# Patient Record
Sex: FEMALE | Race: WHITE | NOT HISPANIC OR LATINO | Employment: OTHER
[De-identification: names, ages, dates, MRNs, and addresses within clinical notes are randomized per-mention and may not be internally consistent; named-entity substitution may affect disease eponyms.]

---
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
## 2018-07-02 ENCOUNTER — HOSPITAL ENCOUNTER (EMERGENCY)
Facility: CLINIC | Age: 18
Discharge: HOME | End: 2018-07-02
Attending: EMERGENCY MEDICINE | Admitting: EMERGENCY MEDICINE
Payer: COMMERCIAL

## 2018-07-02 VITALS
RESPIRATION RATE: 12 BRPM | BODY MASS INDEX: 18.83 KG/M2 | TEMPERATURE: 98.3 F | HEIGHT: 67 IN | WEIGHT: 120 LBS | DIASTOLIC BLOOD PRESSURE: 73 MMHG | HEART RATE: 80 BPM | OXYGEN SATURATION: 100 % | SYSTOLIC BLOOD PRESSURE: 118 MMHG

## 2018-07-02 DIAGNOSIS — N92.0 MENORRHAGIA WITH REGULAR CYCLE: ICD-10-CM

## 2018-07-02 LAB
HCG UR QL: NEGATIVE
HGB BLD-MCNC: 12.7 G/DL (ref 11.7–15.7)

## 2018-07-02 PROCEDURE — 99284 EMERGENCY DEPT VISIT MOD MDM: CPT | Performed by: EMERGENCY MEDICINE

## 2018-07-02 PROCEDURE — 96372 THER/PROPH/DIAG INJ SC/IM: CPT | Performed by: EMERGENCY MEDICINE

## 2018-07-02 PROCEDURE — 25000128 H RX IP 250 OP 636: Performed by: EMERGENCY MEDICINE

## 2018-07-02 PROCEDURE — 99284 EMERGENCY DEPT VISIT MOD MDM: CPT | Mod: Z6 | Performed by: EMERGENCY MEDICINE

## 2018-07-02 PROCEDURE — 81025 URINE PREGNANCY TEST: CPT | Performed by: EMERGENCY MEDICINE

## 2018-07-02 PROCEDURE — 85018 HEMOGLOBIN: CPT | Performed by: EMERGENCY MEDICINE

## 2018-07-02 RX ORDER — KETOROLAC TROMETHAMINE 30 MG/ML
60 INJECTION, SOLUTION INTRAMUSCULAR; INTRAVENOUS ONCE
Status: COMPLETED | OUTPATIENT
Start: 2018-07-02 | End: 2018-07-02

## 2018-07-02 RX ADMIN — KETOROLAC TROMETHAMINE 60 MG: 30 INJECTION, SOLUTION INTRAMUSCULAR at 21:04

## 2018-07-02 NOTE — ED AVS SNAPSHOT
[facility] Emergency Department    [address] [facility] [address]    Phone:  [phone]    Fax:  [phone]                                       Pat Chaney   MRN: [id number]    Department:  [facility] Emergency Department   Date of Visit:  7/2/2018           After Visit Summary Signature Page     I have received my discharge instructions, and my questions have been answered. I have discussed any challenges I see with this plan with the nurse or doctor.    ..........................................................................................................................................  Patient/Patient Representative Signature      ..........................................................................................................................................  Patient Representative Print Name and Relationship to Patient    ..................................................               ................................................  Date                                            Time    ..........................................................................................................................................  Reviewed by Signature/Title    ...................................................              ..............................................  Date                                                            Time

## 2018-07-02 NOTE — ED AVS SNAPSHOT
[facility] Emergency Department    [address] [facility] [address]    Phone:  [phone]    Fax:  [phone]                                       Pat Chaney   MRN: [id number]    Department:  [facility] Emergency Department   Date of Visit:  7/2/2018           Patient Information     Date Of Birth          2000        Your diagnoses for this visit were:     Menorrhagia with regular cycle        You were seen by Arben Keita MD.      Follow-up Information     Schedule an appointment as soon as possible for a visit with [facility].    Specialty:  OB/Gyn    Contact information:    [facility] [address] [phone]    Additional information:    The medical center is located at   [address] ([address] in [place], [place] north   of [place]).        Discharge Instructions         Heavy Menstrual Bleeding    Heavy menstrual bleeding means that your periods are heavier or longer than usual. You may soak through a pad or tampon every 1 to 3 hours on the heaviest days of your period. You may also pass large, dark clots. And your periods may last longer than 7 days.  If you have heavy periods often, this can cause a problem called anemia. With anemia, your red blood cell count is too low. Red blood cells are needed because they help carry oxygen throughout your body. Severe anemia may cause you to look pale and feel weak or tired. You might also become short of breath easily.  There are many possible causes of heavy menstrual bleeding. Hormonal imbalance is the most common cause. Having benign growths in your uterus, such as fibroids or polyps, is another cause. Taking certain medicines or having certain health problems or bleeding disorders are also causes.  To treat heavy menstrual bleeding, your healthcare provider may prescribe medicines first. If these don t help, you may need further testing and  treatments.  Home care  Medicines  If you re prescribed medicines, be sure to take them as directed.    To help control heavy bleeding, any of the following may be used:  ? Hormone therapy (this includes all methods of hormonal birth control such as pills, shots, cream, ring, patch, or hormone-releasing IUD)  ? Nonsteroidal anti-inflammatory drugs (NSAIDs), such as ibuprofen  ? Antifibrinolytic medicines, such as transexamic acid    To help treat anemia, iron supplements may be prescribed.            General care    Get plenty of rest if you tire easily. Avoid heavy exertion.    To help relieve pain or cramping, try using a heating pad on the lower belly or back. A warm bath may also help.  Follow-up care  Follow up with your healthcare provider, or as advised.  When to seek medical advice  Call your healthcare provider right away if any of these occur:    Heavier bleeding (soaking 1 pad or tampon every hour for 3 hours)    Heavy bleeding that lasts longer than 1 week    Fever of 100.4 F (38 C) or higher, or as directed by your provider    Pain or cramping that gets worse instead of better    Signs of anemia such as pale skin, extreme fatigue or weakness, or shortness of breath    Dizziness or fainting  Date Last Reviewed: 10/1/2017    2580-0725 [employer]. [address] [place] [address], [place] [address]. All rights reserved. This information is not intended as a substitute for professional medical care. Always follow your healthcare professional's instructions.          Dysfunctional Uterine Bleeding    Dysfunctional uterine bleeding, also called abnormal uterine bleeding, is a condition in which bleeding is abnormal and occurs at unexpected times of the month. This happens because of changes in the hormones that help control a woman s menstrual cycle each month.  The bleeding may be heavier or lighter than normal. If you have heavy bleeding often, this can lead to a problem called anemia. With anemia,  your red blood cell count is too low. Red blood cells help carry oxygen throughout your body. Severe anemia may cause you to look pale and feel very weak or tired. You might also become short of breath easily.  To treat dysfunctional uterine bleeding, medicines are often tried first. If these don t help, or if you have additional symptoms or have reached menopause, further testing and treatments may be needed. Discuss all of your options with your provider.  Home care  Medicines  If you re prescribed medicines, be sure to take them as directed. Some of the more common medicines you may be prescribed include:    Hormone therapy (Options include most methods of hormonal birth control such as pills, shots, or a hormone-releasing IUD)    Nonsteroidal anti-inflammatory drugs (NSAIDs), such as ibuprofen    Iron supplements, if you have anemia     General care    Get plenty of rest if you tire easily. Avoid heavy exertion.    To help relieve pain or cramping that may occur with bleeding, try using a heating pad on the lower belly or back. A warm bath may also help.  Follow-up care  Follow up with your healthcare provider, or as directed.  When to seek medical advice  Call your healthcare provider right away if:    Bleeding becomes heavy (soaking 1 pad or tampon every hour for 3 hours)    Increased abdominal pain    Irregular bleeding worsens or does not get better even with treatment    Fever of 100.4 F (38 C) or higher, or as directed by your provider    Signs of anemia, such as pale skin, extreme fatigue or weakness, or shortness of breath    Dizziness or fainting   Date Last Reviewed: 11/1/2017 2000-2017 [employer]. [address] [place] [address], [place] [address]. All rights reserved. This information is not intended as a substitute for professional medical care. Always follow your healthcare professional's instructions.          Understanding the Normal Menstrual Cycle  Having a period (menstruation) is a  normal, healthy part of being a woman. It s also part of the menstrual cycle, a process that makes it possible for women to become pregnant. The first day of your period is the first day of your menstrual cycle.   A woman who has irregular cycles can become pregnant during bleeding. This may not be a true menstrual period.   An egg is released    Eggs are female reproductive cells stored in the ovaries. During each cycle, a woman's hormones trigger an egg, (usually 1), to mature and be released from an ovary. This is called ovulation. The egg then travels from the ovary to a fallopian tube.  The egg travels through a tube  The egg moves through the fallopian tube toward the uterus. If sperm are present in the tube, the egg may be fertilized, and pregnancy could result.  The uterine lining grows thicker  The lining of the uterus is made up of blood, tissue, and fluid. During each cycle, hormones cause the lining to thicken. This helps prepare the uterus to receive and nourish a fertilized egg.   The egg and lining are shed  If pregnancy doesn t happen, the egg and thickened lining of the uterus are no longer needed. They are then shed through the vagina. This is called a menstrual period.  How long is each cycle?  It is normal for a cycle to take 21 to 34 days. For teenagers, the time between periods might be as much as 45 days. For adults, it will be around a month from the first day of one period to the first day of the next. That s why you may hear women talk about a  monthly cycle,  even though cycle length can vary from one month to another, and anywhere from 3 to 5 weeks is normal. Not everyone has a 28-day cycle.    How long does a period last?  It s normal for a period to last 2 to 7 days. Talk to your healthcare provider if your period lasts longer than 7 days for 2 cycles in a row.  Date Last Reviewed: 12/1/2016 2000-2017 [employer] [address] [facility], [place] [address]. All rights  reserved. This information is not intended as a substitute for professional medical care. Always follow your healthcare professional's instructions.          24 Hour Appointment Hotline       To make an appointment at any [facility], call [phone] ([phone]). If you don't have a family doctor or clinic, we will help you find one. [facility] are conveniently located to serve the needs of you and your family.             Review of your medicines      Our records show that you are taking the medicines listed below. If these are incorrect, please call your family doctor or clinic.        Dose / Directions Last dose taken    CONCERTA 36 MG CR tablet   Generic drug:  methylphenidate ER        once daily   Refills:  0        RECLIPSEN 0.15-30 MG-MCG per tablet   Generic drug:  desogestrel-ethinyl estradiol        Refills:  0                Procedures and tests performed during your visit     HCG qualitative urine    Hemaglobin      Orders Needing Specimen Collection     None      Pending Results     No orders found from 6/30/2018 to 7/3/2018.            Pending Culture Results     No orders found from 6/30/2018 to 7/3/2018.            Pending Results Instructions     If you had any lab results that were not finalized at the time of your Discharge, you can call the ED Lab Result RN at [phone]. You will be contacted by this team for any positive Lab results or changes in treatment. The nurses are available 7 days a week from 10A to 6:30P.  You can leave a message 24 hours per day and they will return your call.        Test Results From Your Hospital Stay        7/2/2018  9:26 PM      Component Results     Component Value Ref Range & Units Status    HCG Qual Urine Negative NEG^Negative Final    This test is for screening purposes.  Results should be interpreted along with   the clinical picture.  Confirmation testing is available if warranted by   ordering EUT472, HCG Quantitative Pregnancy.       "     2018  9:08 PM      Component Results     Component Value Ref Range & Units Status    Hemoglobin 12.7 11.7 - 15.7 g/dL Final                Thank you for choosing [facility]       Thank you for choosing [facility] for your care. Our goal is always to provide you with excellent care. Hearing back from our patients is one way we can continue to improve our services. Please take a few minutes to complete the written survey that you may receive in the mail after you visit with us. Thank you!        [employer] Information     [employer] lets you send messages to your doctor, view your test results, renew your prescriptions, schedule appointments and more. To sign up, go to [email or web address] . Click on \"Log in\" on the left side of the screen, which will take you to the Welcome page. Then click on \"Sign up Now\" on the right side of the page.     You will be asked to enter the access code listed below, as well as some personal information. Please follow the directions to create your username and password.     Your access code is: [email or web address]  Expires: 2018 10:52 PM     Your access code will  in 90 days. If you need help or a new code, please call your [facility] clinic or [phone].        Care EveryWhere ID     This is your Care EveryWhere ID. This could be used by other organizations to access your [facility] medical records  [id number]        Equal Access to Services     JAE ADAMS : Hadii rylie Marino, waaxda jared, qaybta kaalmak valdez, edgardo whitman . So [facility] [phone].    ATENCIÓN: Si habla español, tiene a friend disposición servicios gratuitos de asistencia lingüística. Llame al [phone].    We comply with applicable federal civil rights laws and [place] laws. We do not discriminate on the basis of race, color, national origin, age, disability, sex, sexual orientation, or gender identity.            After Visit Summary       This is your record. " Keep this with you and show to your community pharmacist(s) and doctor(s) at your next visit.

## 2018-07-03 NOTE — DISCHARGE INSTRUCTIONS
Heavy Menstrual Bleeding    Heavy menstrual bleeding means that your periods are heavier or longer than usual. You may soak through a pad or tampon every 1 to 3 hours on the heaviest days of your period. You may also pass large, dark clots. And your periods may last longer than 7 days.  If you have heavy periods often, this can cause a problem called anemia. With anemia, your red blood cell count is too low. Red blood cells are needed because they help carry oxygen throughout your body. Severe anemia may cause you to look pale and feel weak or tired. You might also become short of breath easily.  There are many possible causes of heavy menstrual bleeding. Hormonal imbalance is the most common cause. Having benign growths in your uterus, such as fibroids or polyps, is another cause. Taking certain medicines or having certain health problems or bleeding disorders are also causes.  To treat heavy menstrual bleeding, your healthcare provider may prescribe medicines first. If these don t help, you may need further testing and treatments.  Home care  Medicines  If you re prescribed medicines, be sure to take them as directed.    To help control heavy bleeding, any of the following may be used:  ? Hormone therapy (this includes all methods of hormonal birth control such as pills, shots, cream, ring, patch, or hormone-releasing IUD)  ? Nonsteroidal anti-inflammatory drugs (NSAIDs), such as ibuprofen  ? Antifibrinolytic medicines, such as transexamic acid    To help treat anemia, iron supplements may be prescribed.            General care    Get plenty of rest if you tire easily. Avoid heavy exertion.    To help relieve pain or cramping, try using a heating pad on the lower belly or back. A warm bath may also help.  Follow-up care  Follow up with your healthcare provider, or as advised.  When to seek medical advice  Call your healthcare provider right away if any of these occur:    Heavier bleeding (soaking 1 pad or tampon  every hour for 3 hours)    Heavy bleeding that lasts longer than 1 week    Fever of 100.4 F (38 C) or higher, or as directed by your provider    Pain or cramping that gets worse instead of better    Signs of anemia such as pale skin, extreme fatigue or weakness, or shortness of breath    Dizziness or fainting  Date Last Reviewed: 10/1/2017    9557-9753 [employer]. [address] [place] [address]. All rights reserved. This information is not intended as a substitute for professional medical care. Always follow your healthcare professional's instructions.          Dysfunctional Uterine Bleeding    Dysfunctional uterine bleeding, also called abnormal uterine bleeding, is a condition in which bleeding is abnormal and occurs at unexpected times of the month. This happens because of changes in the hormones that help control a woman s menstrual cycle each month.  The bleeding may be heavier or lighter than normal. If you have heavy bleeding often, this can lead to a problem called anemia. With anemia, your red blood cell count is too low. Red blood cells help carry oxygen throughout your body. Severe anemia may cause you to look pale and feel very weak or tired. You might also become short of breath easily.  To treat dysfunctional uterine bleeding, medicines are often tried first. If these don t help, or if you have additional symptoms or have reached menopause, further testing and treatments may be needed. Discuss all of your options with your provider.  Home care  Medicines  If you re prescribed medicines, be sure to take them as directed. Some of the more common medicines you may be prescribed include:    Hormone therapy (Options include most methods of hormonal birth control such as pills, shots, or a hormone-releasing IUD)    Nonsteroidal anti-inflammatory drugs (NSAIDs), such as ibuprofen    Iron supplements, if you have anemia     General care    Get plenty of rest if you tire easily. Avoid  heavy exertion.    To help relieve pain or cramping that may occur with bleeding, try using a heating pad on the lower belly or back. A warm bath may also help.  Follow-up care  Follow up with your healthcare provider, or as directed.  When to seek medical advice  Call your healthcare provider right away if:    Bleeding becomes heavy (soaking 1 pad or tampon every hour for 3 hours)    Increased abdominal pain    Irregular bleeding worsens or does not get better even with treatment    Fever of 100.4 F (38 C) or higher, or as directed by your provider    Signs of anemia, such as pale skin, extreme fatigue or weakness, or shortness of breath    Dizziness or fainting   Date Last Reviewed: 11/1/2017 2000-2017 [employer]. [address] [place] [address], [place] [address]. All rights reserved. This information is not intended as a substitute for professional medical care. Always follow your healthcare professional's instructions.          Understanding the Normal Menstrual Cycle  Having a period (menstruation) is a normal, healthy part of being a woman. It s also part of the menstrual cycle, a process that makes it possible for women to become pregnant. The first day of your period is the first day of your menstrual cycle.   A woman who has irregular cycles can become pregnant during bleeding. This may not be a true menstrual period.   An egg is released    Eggs are female reproductive cells stored in the ovaries. During each cycle, a woman's hormones trigger an egg, (usually 1), to mature and be released from an ovary. This is called ovulation. The egg then travels from the ovary to a fallopian tube.  The egg travels through a tube  The egg moves through the fallopian tube toward the uterus. If sperm are present in the tube, the egg may be fertilized, and pregnancy could result.  The uterine lining grows thicker  The lining of the uterus is made up of blood, tissue, and fluid. During each cycle, hormones cause  the lining to thicken. This helps prepare the uterus to receive and nourish a fertilized egg.   The egg and lining are shed  If pregnancy doesn t happen, the egg and thickened lining of the uterus are no longer needed. They are then shed through the vagina. This is called a menstrual period.  How long is each cycle?  It is normal for a cycle to take 21 to 34 days. For teenagers, the time between periods might be as much as 45 days. For adults, it will be around a month from the first day of one period to the first day of the next. That s why you may hear women talk about a  monthly cycle,  even though cycle length can vary from one month to another, and anywhere from 3 to 5 weeks is normal. Not everyone has a 28-day cycle.    How long does a period last?  It s normal for a period to last 2 to 7 days. Talk to your healthcare provider if your period lasts longer than 7 days for 2 cycles in a row.  Date Last Reviewed: 12/1/2016 2000-2017 [employer]. [address] [place] [address], [place] [address]. All rights reserved. This information is not intended as a substitute for professional medical care. Always follow your healthcare professional's instructions.

## 2018-07-03 NOTE — ED NOTES
Pt presents to ED with concerns of heavy menstrual bleeding, dizziness and a headache since waking up this am. Standing makes the dizziness worse. Pt reports soaking through 1 normal pad per hour. Headache is constant. Pt reports some nausea, no vomiting, no diarrhea, no chills. Pt tolerating fluids well today; decreased appetite. Pt states there is a chance she could be pregnant, pt is on birth control.

## 2018-07-03 NOTE — ED PROVIDER NOTES
"  History     Chief Complaint   Patient presents with     Vaginal Bleeding     heavy flow, has been trying different BCP's to control with no help, she also has h/a and nausea      HPI  Pat Chaney is a 18 year old female with vaginal/uterine bleeding beginning this am. 2 days late for her usual menstrual cycle.   Not saturating pads, but changing them every one hr. No passage of clots. Feeling dizzy today, no syncope. Hx of similar DUB or menorrhagia. On OCP (Sprintec 28 day). .    Problem List:    There are no active problems to display for this patient.     Past Medical History:    History reviewed. No pertinent past medical history.    Past Surgical History:    History reviewed. No pertinent surgical history.    Family History:    Family History   Problem Relation Age of Onset     Asthma Maternal Grandmother      Arthritis Maternal Grandmother      Social History:  Marital Status:  Single [1]  Social History   Substance Use Topics     Smoking status: Passive Smoke Exposure - Never Smoker     Smokeless tobacco: Never Used      Comment: outdoors     Alcohol use No        Medications:      CONCERTA 36 MG OR TBCR   RECLIPSEN 0.15-30 MG-MCG per tablet       Review of Systems   Constitutional: Negative.    Gastrointestinal: Positive for abdominal pain (mild pelvic cramping) and nausea. Negative for blood in stool and vomiting.   Genitourinary: Negative.    Skin: Negative.    Neurological: Positive for dizziness and headaches.         Physical Exam   BP: (!) 137/91  Pulse: 80  Heart Rate: 99  Temp: 98.3  F (36.8  C)  Resp: 17  Height: 170.2 cm (5' 7\")  Weight: 54.4 kg (120 lb)  SpO2: 100 %      Physical Exam   Constitutional: She is oriented to person, place, and time. She appears well-developed and well-nourished. No distress.   HENT:   Head: Normocephalic and atraumatic.   Eyes: Conjunctivae and EOM are normal. No scleral icterus.   Neck: Normal range of motion. Neck supple.   Cardiovascular: Normal rate, " regular rhythm and normal heart sounds.    Pulmonary/Chest: Effort normal and breath sounds normal. No respiratory distress.   Abdominal: Soft. She exhibits no distension. There is no tenderness.   Musculoskeletal: Normal range of motion. She exhibits no edema.   Neurological: She is alert and oriented to person, place, and time.   Skin: Skin is warm and dry. No rash noted. No erythema. No pallor.   Psychiatric: She has a normal mood and affect. Her behavior is normal.   Nursing note and vitals reviewed.      ED Course     ED Course     Procedures               No results found for this or any previous visit (from the past 24 hour(s)).    Medications   ketorolac (TORADOL) injection 60 mg (60 mg Intramuscular Given 7/2/18 8664)     Reviewed case and consulted with Dr. Fay, Ob/Gyn.    Assessments & Plan (with Medical Decision Making)   Heavy menstrual flow, but hemodyn stable with NL Hgb and neg UPT. Will have start next pack of Sprintec OCP and f/u with Ob/Gyn. She was provided instructions for supportive care and will return as needed for worsened condition or worsening symptoms, or new problems or concerns.     I have reviewed the nursing notes.    I have reviewed the findings, diagnosis, plan and need for follow up with the patient.    Discharge Medication List as of 7/2/2018 10:52 PM          Final diagnoses:   Menorrhagia with regular cycle       7/2/2018   [facility] EMERGENCY DEPARTMENT          Arben Keita MD  07/08/18 9475

## 2018-07-08 ASSESSMENT — ENCOUNTER SYMPTOMS
VOMITING: 0
CONSTITUTIONAL NEGATIVE: 1
DIZZINESS: 1
ABDOMINAL PAIN: 1
BLOOD IN STOOL: 0
NAUSEA: 1
HEADACHES: 1

## 2018-12-27 ENCOUNTER — HOSPITAL ENCOUNTER (EMERGENCY)
Facility: CLINIC | Age: 18
Discharge: HOME | End: 2018-12-27
Attending: NURSE PRACTITIONER | Admitting: NURSE PRACTITIONER
Payer: COMMERCIAL

## 2018-12-27 VITALS
RESPIRATION RATE: 18 BRPM | TEMPERATURE: 98.4 F | OXYGEN SATURATION: 100 % | DIASTOLIC BLOOD PRESSURE: 79 MMHG | SYSTOLIC BLOOD PRESSURE: 122 MMHG | HEART RATE: 92 BPM

## 2018-12-27 DIAGNOSIS — B34.9 VIRAL SYNDROME: ICD-10-CM

## 2018-12-27 PROCEDURE — 99213 OFFICE O/P EST LOW 20 MIN: CPT | Mod: Z6 | Performed by: NURSE PRACTITIONER

## 2018-12-27 PROCEDURE — G0463 HOSPITAL OUTPT CLINIC VISIT: HCPCS | Performed by: NURSE PRACTITIONER

## 2018-12-27 RX ORDER — ARIPIPRAZOLE 5 MG/1
10 TABLET ORAL DAILY
COMMUNITY
Start: 2018-06-21

## 2018-12-27 NOTE — ED PROVIDER NOTES
History     Chief Complaint   Patient presents with     Otalgia     right ear pain  has bloody drainage     HPI      SUBJECTIVE: Pat Chaney  is here today because of:Ear Pain  The patient has had symptoms of earache, nausea and headache.   Onset of symptoms was 1 day ago. Course of illness is worsening.  Patient denies exposure to illness at home or work/school.   Patient denies fever, cough, nasal congestion/runny nose, vomiting, diarrhea, chest congestion, wheezing, myalgias and abdominal pain, dysuria, difficulty stooling, rashes, shortness of breath, chest pain  Treatment measures tried include nothing.  Patient is not a smoker  Pt reports hx of frequent ear infections as child but denies PE tubes and denies hx of ruptured ear drum    Problem List:    There are no active problems to display for this patient.       Past Medical History:    History reviewed. No pertinent past medical history.    Past Surgical History:    History reviewed. No pertinent surgical history.    Family History:    Family History   Problem Relation Age of Onset     Asthma Maternal Grandmother      Arthritis Maternal Grandmother        Social History:  Marital Status:  Single [1]  Social History     Tobacco Use     Smoking status: Passive Smoke Exposure - Never Smoker     Smokeless tobacco: Never Used     Tobacco comment: outdoors   Substance Use Topics     Alcohol use: No     Drug use: No        Medications:      ARIPiprazole (ABILIFY) 5 MG tablet   CONCERTA 36 MG OR TBCR   RECLIPSEN 0.15-30 MG-MCG per tablet       Review of Systems  As mentioned above in the history present illness. All other systems were reviewed and are negative.    Physical Exam   BP: 122/79  Pulse: 92  Temp: 98.4  F (36.9  C)  Resp: 18  SpO2: 100 %    Physical Exam  GENERAL: alert, no acute distress and no apparent distress  EYES:  Right conjunctiva is not injected and without discharge.  Left conjunctiva is not injected and without discharge.  EARS: Right  TM is normal: no effusions, no erythema, and normal landmarks.  Left TM is normal: no effusions, no erythema, and normal landmarks.  NOSE: Nasal mucosa is normal.  Sinus not tender.  THROAT: exudate absent and posterior pharnyx cobblestoning.  NECK: supple with shotty nodes  CARDIAC:NORMAL - regular rate and rhythm without murmur.  RESP: Normal - CTA without rales, rhonchi, or wheezing.  SKIN: normal    ED Course        Procedures    No results found for this or any previous visit (from the past 24 hour(s)).    Medications - No data to display    Assessments & Plan (with Medical Decision Making)     I have reviewed the nursing notes.    I have reviewed the findings, diagnosis, plan and need for follow up with the patient.  Medical Decision Making:  CXR is not indicated.  Rapid Strep test is not indicated.     Assessment:  1) Viral syndrome.    PLAN:    Use acetaminophen, ibuprofen, increase fluids and rest.   Follow up with any questions or problems           Medication List      There are no discharge medications for this visit.         Final diagnoses:   Viral syndrome       12/27/2018   [facility] EMERGENCY DEPARTMENT     Zeynep Foley, KIRBY CNP  12/27/18 5624

## 2018-12-27 NOTE — LETTER
December 27, 2018      To Whom It May Concern:      Pat Chaney was seen in our Emergency Department today, 12/27/18.  I expect her condition to improve over the next few days.  She may return to work/school when improved.    Sincerely,        KIRBY Mcclain CNP

## 2018-12-27 NOTE — ED TRIAGE NOTES
Patient presents today with right ear pain . Symptoms started a few days ago . Arrived to urgent care ambulatory .

## 2018-12-27 NOTE — ED AVS SNAPSHOT
[facility] Emergency Department  [address] [address]  Phone:  [phone]  Fax:  [phone]                                    Pat Chaney   MRN: [id number]    Department:  [facility] Emergency Department   Date of Visit:  12/27/2018           After Visit Summary Signature Page    I have received my discharge instructions, and my questions have been answered. I have discussed any challenges I see with this plan with the nurse or doctor.    ..........................................................................................................................................  Patient/Patient Representative Signature      ..........................................................................................................................................  Patient Representative Print Name and Relationship to Patient    ..................................................               ................................................  Date                                   Time    ..........................................................................................................................................  Reviewed by Signature/Title    ...................................................              ..............................................  Date                                               Time          22EPIC Rev 08/18

## 2019-01-28 ENCOUNTER — APPOINTMENT (OUTPATIENT)
Dept: ULTRASOUND IMAGING | Facility: CLINIC | Age: 19
End: 2019-01-28
Payer: COMMERCIAL

## 2019-01-28 ENCOUNTER — HOSPITAL ENCOUNTER (EMERGENCY)
Facility: CLINIC | Age: 19
Discharge: HOME | End: 2019-01-28
Attending: EMERGENCY MEDICINE | Admitting: EMERGENCY MEDICINE
Payer: COMMERCIAL

## 2019-01-28 VITALS
DIASTOLIC BLOOD PRESSURE: 68 MMHG | SYSTOLIC BLOOD PRESSURE: 133 MMHG | HEIGHT: 68 IN | HEART RATE: 93 BPM | WEIGHT: 130 LBS | OXYGEN SATURATION: 100 % | RESPIRATION RATE: 16 BRPM | BODY MASS INDEX: 19.7 KG/M2 | TEMPERATURE: 98.4 F

## 2019-01-28 DIAGNOSIS — N93.9 VAGINAL BLEEDING: ICD-10-CM

## 2019-01-28 LAB
ALBUMIN UR-MCNC: 30 MG/DL
APPEARANCE UR: ABNORMAL
B-HCG SERPL-ACNC: <1 IU/L (ref 0–5)
BILIRUB UR QL STRIP: NEGATIVE
COLOR UR AUTO: YELLOW
GLUCOSE UR STRIP-MCNC: NEGATIVE MG/DL
HGB UR QL STRIP: ABNORMAL
KETONES UR STRIP-MCNC: 5 MG/DL
LEUKOCYTE ESTERASE UR QL STRIP: NEGATIVE
MUCOUS THREADS #/AREA URNS LPF: PRESENT /LPF
NITRATE UR QL: NEGATIVE
PH UR STRIP: 6 PH (ref 5–7)
RBC #/AREA URNS AUTO: >182 /HPF (ref 0–2)
SOURCE: ABNORMAL
SP GR UR STRIP: 1.03 (ref 1–1.03)
SQUAMOUS #/AREA URNS AUTO: <1 /HPF (ref 0–1)
UROBILINOGEN UR STRIP-MCNC: 4 MG/DL (ref 0–2)
WBC #/AREA URNS AUTO: 3 /HPF (ref 0–5)

## 2019-01-28 PROCEDURE — 81001 URINALYSIS AUTO W/SCOPE: CPT | Performed by: EMERGENCY MEDICINE

## 2019-01-28 PROCEDURE — 76705 ECHO EXAM OF ABDOMEN: CPT

## 2019-01-28 PROCEDURE — 76801 OB US < 14 WKS SINGLE FETUS: CPT

## 2019-01-28 PROCEDURE — 76705 ECHO EXAM OF ABDOMEN: CPT | Mod: 26 | Performed by: EMERGENCY MEDICINE

## 2019-01-28 PROCEDURE — 99284 EMERGENCY DEPT VISIT MOD MDM: CPT | Mod: 25 | Performed by: EMERGENCY MEDICINE

## 2019-01-28 PROCEDURE — 84702 CHORIONIC GONADOTROPIN TEST: CPT | Performed by: EMERGENCY MEDICINE

## 2019-01-28 PROCEDURE — 99284 EMERGENCY DEPT VISIT MOD MDM: CPT | Mod: 25

## 2019-01-28 PROCEDURE — 25000132 ZZH RX MED GY IP 250 OP 250 PS 637: Performed by: EMERGENCY MEDICINE

## 2019-01-28 RX ORDER — IBUPROFEN 400 MG/1
800 TABLET, FILM COATED ORAL ONCE
Status: COMPLETED | OUTPATIENT
Start: 2019-01-28 | End: 2019-01-28

## 2019-01-28 RX ORDER — DESOGESTREL AND ETHINYL ESTRADIOL 0.15-0.03
1 KIT ORAL DAILY
Refills: 2 | Status: ON HOLD | COMMUNITY
Start: 2019-01-13 | End: 2020-03-13

## 2019-01-28 RX ADMIN — IBUPROFEN 800 MG: 400 TABLET ORAL at 12:46

## 2019-01-28 ASSESSMENT — MIFFLIN-ST. JEOR: SCORE: 1408.18

## 2019-01-28 NOTE — ED AVS SNAPSHOT
[facility] Emergency Department  [address] [address]  Phone:  [phone]  Fax:  [phone]                                    Pat Chaney   MRN: [id number]    Department:  [facility] Emergency Department   Date of Visit:  1/28/2019           After Visit Summary Signature Page    I have received my discharge instructions, and my questions have been answered. I have discussed any challenges I see with this plan with the nurse or doctor.    ..........................................................................................................................................  Patient/Patient Representative Signature      ..........................................................................................................................................  Patient Representative Print Name and Relationship to Patient    ..................................................               ................................................  Date                                   Time    ..........................................................................................................................................  Reviewed by Signature/Title    ...................................................              ..............................................  Date                                               Time          22EPIC Rev 08/18

## 2019-01-28 NOTE — ED PROVIDER NOTES
"  History     Chief Complaint   Patient presents with     Vaginal Bleeding     Took pregnancy test on Friday that was (+) - this morning with 'alot' of vaginal bleeding     HPI  Pat Chaney is a 18 year old  female who presents for vaginal bleeding.  Started this morning.  Described as more than a regular period.  She does have abdominal pain.  Pain is localized to suprapubic region, described as cramping, rated as mild to moderate.  No other vaginal discharge.  No vulvar irritation.  She reports that she is pregnant, positive home pregnancy, she is not sure of dates that she does not normally have periods due to her birth control.  However she felt slightly sick to her stomach and had some breast tenderness and therefore took a test at home.  She does not have fever, chest pain, shortness of breath, nausea, vomiting, diarrhea, dysuria, rash, weakness, paresthesias or lightheadedness.      Allergies:  No Known Allergies    Problem List:    There are no active problems to display for this patient.       Past Medical History:    No past medical history on file.    Past Surgical History:    No past surgical history on file.    Family History:    Family History   Problem Relation Age of Onset     Asthma Maternal Grandmother      Arthritis Maternal Grandmother        Social History:  Marital Status:  Single [1]  Social History     Tobacco Use     Smoking status: Passive Smoke Exposure - Never Smoker     Smokeless tobacco: Never Used     Tobacco comment: outdoors   Substance Use Topics     Alcohol use: No     Drug use: No        Medications:      APRI 0.15-30 MG-MCG tablet   CONCERTA 36 MG OR TBCR   ARIPiprazole (ABILIFY) 5 MG tablet         Review of Systems  Pertinent positives and negatives listed in the HPI, all other systems reviewed and are negative.    Physical Exam   BP: 133/68  Pulse: 93  Temp: 98.4  F (36.9  C)  Resp: 16  Height: 172.7 cm (5' 8\")  Weight: 59 kg (130 lb)  SpO2: 100 %      Physical Exam "   Constitutional: She is oriented to person, place, and time. She appears well-developed and well-nourished. She appears distressed.   HENT:   Head: Normocephalic and atraumatic.   Right Ear: External ear normal.   Left Ear: External ear normal.   Nose: Nose normal.   Eyes: Conjunctivae are normal. No scleral icterus.   Neck: Normal range of motion.   Cardiovascular: Normal rate and regular rhythm.   Pulmonary/Chest: Effort normal. No stridor. No respiratory distress.   Abdominal: Soft. She exhibits no distension and no mass. There is no tenderness. There is no guarding.   Neurological: She is alert and oriented to person, place, and time.   Skin: Skin is warm and dry. She is not diaphoretic. No erythema.   Psychiatric: She has a normal mood and affect. Her behavior is normal.   Nursing note and vitals reviewed.      ED Course        Procedures    Results for orders placed during the hospital encounter of 01/28/19   POC US OB TRANSABDOMINAL LIMITED    [facility] Procedure Note     Limited Bedside ED Pelvic Ultrasound (PREGNANT PATIENT):    PROCEDURE: PERFORMED BY: Dr. Tano Heredia  INDICATIONS/SYMPTOM: Abdominal Pain and Vaginal Bleeding  PROBE: Low frequency convex probe  Type of US Study: Transabdominal Exam  BODY LOCATION: Pelvis  FINDINGS: No IUP seen; empty uterus.  INTERPRETATION: No IUP.  IMAGE DOCUMENTATION: Images were archived to PACs system.              Critical Care time:  none               Results for orders placed or performed during the hospital encounter of 01/28/19 (from the past 24 hour(s))   POC US OB TRANSABDOMINAL LIMITED    Impression    [facility] Procedure Note     Limited Bedside ED Pelvic Ultrasound (PREGNANT PATIENT):    PROCEDURE: PERFORMED BY: Dr. Tano Heredia  INDICATIONS/SYMPTOM: Abdominal Pain and Vaginal Bleeding  PROBE: Low frequency convex probe  Type of US Study: Transabdominal Exam  BODY LOCATION: Pelvis  FINDINGS: No IUP seen; empty  uterus.  INTERPRETATION: No IUP.  IMAGE DOCUMENTATION: Images were archived to PACs system.    HCG quantitative pregnancy (blood)   Result Value Ref Range    HCG Quantitative Serum <1 0 - 5 IU/L   UA reflex to Microscopic   Result Value Ref Range    Color Urine Yellow     Appearance Urine Slightly Cloudy     Glucose Urine Negative NEG^Negative mg/dL    Bilirubin Urine Negative NEG^Negative    Ketones Urine 5 (A) NEG^Negative mg/dL    Specific Gravity Urine 1.026 1.003 - 1.035    Blood Urine Large (A) NEG^Negative    pH Urine 6.0 5.0 - 7.0 pH    Protein Albumin Urine 30 (A) NEG^Negative mg/dL    Urobilinogen mg/dL 4.0 (H) 0.0 - 2.0 mg/dL    Nitrite Urine Negative NEG^Negative    Leukocyte Esterase Urine Negative NEG^Negative    Source Midstream Urine     RBC Urine >182 (H) 0 - 2 /HPF    WBC Urine 3 0 - 5 /HPF    Squamous Epithelial /HPF Urine <1 0 - 1 /HPF    Mucous Urine Present (A) NEG^Negative /LPF   OB < 14 weeks single or first gestation US    Narrative    US OB < 14 WEEKS SINGLE 2019 1:16 PM    HISTORY: Vaginal bleeding. Positive pregnancy test on Friday. Negative  pregnancy test today. Serum hCG pending.    TECHNIQUE: Transabdominal images of the pelvis are supplemented with  endovaginal images to better define anatomy.    COMPARISON: None.    FINDINGS: The uterus measures 8.0 x 3.5 x 4.7cm.  No intrauterine  pregnancy is demonstrated. The endometrium is heterogeneous and  measures up to 1 cm in thickness. Both ovaries are normal in  appearance. No adnexal masses or free pelvic fluid are seen.      Impression    IMPRESSION: No intrauterine pregnancy is demonstrated. In the setting  of a positive pregnancy test differential diagnostic considerations  include a missed , early intrauterine pregnancy below the  resolving limits of ultrasound (less than 5 weeks), and an ectopic  pregnancy. There are no suspicious adnexal findings for an ectopic  pregnancy at this time. Correlation with serial  quantitative beta-hCG  levels is suggested.      YINKA WARD MD       Medications   ibuprofen (ADVIL/MOTRIN) tablet 800 mg (800 mg Oral Given 19 1246)       Assessments & Plan (with Medical Decision Making)   18 year old female who presents with vaginal bleeding.  Heart rate 93, temperature is 98.4 F, blood pressure is 133/68, SPO2 is 100% on room air.  Differential includes pregnancy, ectopic pregnancy, spontaneous , cervicitis, ovulation vs anovulation, fibroid, cervical lesion, hormonal dysfunction. She is given ibuprofen for pain.  Bedside ultrasound is negative for IUP.  Radiology ultrasound obtained, images reviewed independently as well as radiology read reviewed, also negative for intrauterine pregnancy, no signs of extrauterine pregnancy or ectopic pregnancy.  Her beta hCG is 0 here.  This makes pregnancy unlikely.  At this point she is safe to discharge with instructions to return if she has worsening symptoms or other concerns, repeat her pregnancy test in 1 week, otherwise follow-up in clinic.  The patient is in agreement with this plan.    Rh status positive per review of Care Everywhere, no indication for rhogam     I have reviewed the nursing notes.    I have reviewed the findings, diagnosis, plan and need for follow up with the patient.          Medication List      There are no discharge medications for this visit.         Final diagnoses:   Vaginal bleeding       2019   [facility] EMERGENCY DEPARTMENT     Tano Heredia MD  19 [id number]

## 2019-01-28 NOTE — DISCHARGE INSTRUCTIONS
Return to the emergency department if you have worsening pain, increased bleeding and having to change pads every hour, lightheadedness, or other concerns.  Otherwise follow-up in clinic for recheck.

## 2019-01-28 NOTE — ED NOTES
Pt states she took a home pregnancy on Friday and was positive.  Today has had bright red vaginal bleeding with cramping.  Pt states she has gone through 3 pads In 5 hours.  Denies lightheaded or dizziness.

## 2019-03-19 ENCOUNTER — HOSPITAL ENCOUNTER (EMERGENCY)
Facility: CLINIC | Age: 19
Discharge: HOME | End: 2019-03-19
Attending: PHYSICIAN ASSISTANT | Admitting: PHYSICIAN ASSISTANT
Payer: COMMERCIAL

## 2019-03-19 VITALS
OXYGEN SATURATION: 99 % | BODY MASS INDEX: 18.94 KG/M2 | HEIGHT: 68 IN | WEIGHT: 125 LBS | DIASTOLIC BLOOD PRESSURE: 77 MMHG | SYSTOLIC BLOOD PRESSURE: 127 MMHG | TEMPERATURE: 97.9 F | HEART RATE: 96 BPM | RESPIRATION RATE: 16 BRPM

## 2019-03-19 DIAGNOSIS — J02.0 STREP THROAT: ICD-10-CM

## 2019-03-19 LAB
INTERNAL QC OK POCT: YES
S PYO AG THROAT QL IA.RAPID: POSITIVE

## 2019-03-19 PROCEDURE — 99214 OFFICE O/P EST MOD 30 MIN: CPT | Mod: Z6 | Performed by: PHYSICIAN ASSISTANT

## 2019-03-19 PROCEDURE — G0463 HOSPITAL OUTPT CLINIC VISIT: HCPCS | Performed by: PHYSICIAN ASSISTANT

## 2019-03-19 PROCEDURE — 87880 STREP A ASSAY W/OPTIC: CPT | Performed by: PHYSICIAN ASSISTANT

## 2019-03-19 RX ORDER — PENICILLIN V POTASSIUM 500 MG/1
500 TABLET, FILM COATED ORAL 2 TIMES DAILY
Qty: 20 TABLET | Refills: 0 | Status: SHIPPED | OUTPATIENT
Start: 2019-03-19 | End: 2019-03-29

## 2019-03-19 ASSESSMENT — ENCOUNTER SYMPTOMS
HEADACHES: 1
MYALGIAS: 1
FEVER: 1
SORE THROAT: 1

## 2019-03-19 ASSESSMENT — MIFFLIN-ST. JEOR: SCORE: 1385.5

## 2019-03-19 NOTE — ED AVS SNAPSHOT
[facility] Emergency Department  [address] [address]  Phone:  [phone]  Fax:  [phone]                                    Pat Chaney   MRN: [id number]    Department:  [facility] Emergency Department   Date of Visit:  3/19/2019           After Visit Summary Signature Page    I have received my discharge instructions, and my questions have been answered. I have discussed any challenges I see with this plan with the nurse or doctor.    ..........................................................................................................................................  Patient/Patient Representative Signature      ..........................................................................................................................................  Patient Representative Print Name and Relationship to Patient    ..................................................               ................................................  Date                                   Time    ..........................................................................................................................................  Reviewed by Signature/Title    ...................................................              ..............................................  Date                                               Time          22EPIC Rev 08/18

## 2019-03-20 NOTE — ED PROVIDER NOTES
"  History     Chief Complaint   Patient presents with     Pharyngitis     HPI    Pat Chaney  is a 18 year old female who is here today because of: Sore Throat.  The patient has had symptoms of fever, sore throat, nasal congestion/runny nose, headache and myalgias.   Onset of symptoms was 2 days ago. Course of illness is same.  Patient denies exposure to illness at home or work/school.   Patient denies cough, earache, nausea, vomiting, diarrhea and abdominal pain or rash.   Treatment measures tried include acetaminophen, ibuprofen.        Problem list, Medication list, Allergies, and Medical/Social/Surgical histories reviewed in [facility] and updated as appropriate.    Allergies:  No Known Allergies    Problem List:    There are no active problems to display for this patient.       Past Medical History:    History reviewed. No pertinent past medical history.    Past Surgical History:    History reviewed. No pertinent surgical history.    Family History:    Family History   Problem Relation Age of Onset     Asthma Maternal Grandmother      Arthritis Maternal Grandmother        Social History:  Marital Status:  Single [1]  Social History     Tobacco Use     Smoking status: Passive Smoke Exposure - Never Smoker     Smokeless tobacco: Never Used     Tobacco comment: outdoors   Substance Use Topics     Alcohol use: No     Drug use: No        Medications:      penicillin V (VEETID) 500 MG tablet   APRI 0.15-30 MG-MCG tablet   ARIPiprazole (ABILIFY) 5 MG tablet   CONCERTA 36 MG OR TBCR         Review of Systems   Constitutional: Positive for fever.   HENT: Positive for sore throat.    Musculoskeletal: Positive for myalgias.   Neurological: Positive for headaches.   All other systems reviewed and are negative.      Physical Exam   BP: 127/77  Pulse: 96  Temp: 97.9  F (36.6  C)  Resp: 16  Height: 172.7 cm (5' 8\")  Weight: 56.7 kg (125 lb)  SpO2: 99 %      Physical Exam     /77   Pulse 96   Temp 97.9  F (36.6  C) " "(Oral)   Resp 16   Ht 1.727 m (5' 8\")   Wt 56.7 kg (125 lb)   SpO2 99%   BMI 19.01 kg/m    General: healthy, alert with no acute distress, and non toxic in appearance  Eyes - conjunctivae clear.  Ears - External ears normal. Canals clear. TM's normal.  Nose/Sinuses - Nares normal.Mucosa normal. No drainage or sinus tenderness.  Oropharynx - Lips, mucosa, and tongue normal. Positive findings: oropharyngeal erythema, +1 bilateral tonsillar hypertrophy with no exudates present.  Uvula midline, no dysphonia or dysphasia.  No stridor or trismus.  Neck - Neck supple; Positive findings: moderate anterior cervical nodes. No meningeal signs.   Lungs - Lungs clear; no wheezing or rales.  Heart - regular rate and rhythm. No murmurs, rub.  Abdomen: Abdomen soft, non-tender. BS normal. No masses, organomegaly  SKIN: no suspicious lesions or rashes    Labs:  Rapid Strep test is positive  Results for orders placed or performed during the hospital encounter of 03/19/19 (from the past 24 hour(s))   Rapid strep group A screen POCT   Result Value Ref Range    Rapid Strep A Screen positive neg    Internal QC OK Yes          ED Course        Procedures              Critical Care time:  none               Results for orders placed or performed during the hospital encounter of 03/19/19 (from the past 24 hour(s))   Rapid strep group A screen POCT   Result Value Ref Range    Rapid Strep A Screen positive neg    Internal QC OK Yes        Medications - No data to display    Assessments & Plan (with Medical Decision Making)     I have reviewed the nursing notes.    I have reviewed the findings, diagnosis, plan and need for follow up with the patient.   18 old female presents the urgent care with sore throat, fever, my body aches and headache for the past 2 days.  Rapid strep obtained in office today was positive.  Discussed influenza testing, but patient declined at this time.  See exam findings above.  Patient contagious for 24 hours on " antibiotics.  Patient given penicillin VK twice daily for 10 days for treatment of strep throat.  Patient increase fluids, rest, Tylenol and ibuprofen over-the-counter as needed for pain and fever.  Patient to return to the emergency department if symptoms worsen or change these were discussed with patient given discharge paperwork.  No concerns for peritonsillar abscess/cellulitis or Marquis's angina.  Patient discharged in stable condition.       Medication List      Started    penicillin V 500 MG tablet  Commonly known as:  VEETID  500 mg, Oral, 2 TIMES DAILY            Final diagnoses:   Strep throat       3/19/2019   [facility] EMERGENCY DEPARTMENT     Kecia Barnes PA-C  03/19/19 1931

## 2019-03-20 NOTE — DISCHARGE INSTRUCTIONS
Use Medication as directed    Throw away toothbrush tomorrow night get a new one.  Patient contagious for 24 hours on antibiotics.    Symptomatic treatment with fluids, rest, salt water gargles, and cool humidifier.  May use acetaminophen, ibuprofen as needed.     Patient may return to work/school after 24 hours of antibiotic treatment and fever free for 24 hours.    Return to care if any worsening symptoms or if not improving ([place] may need to be ruled out if symptoms fail to improve).    Patient to go to Emergency Room if drooling, change in voice, difficulty swallowing or talking, or persistent fevers occur.      Patient voiced understanding of instructions given.

## 2019-07-24 ENCOUNTER — HOSPITAL ENCOUNTER (EMERGENCY)
Facility: CLINIC | Age: 19
Discharge: HOME | End: 2019-07-24
Attending: EMERGENCY MEDICINE | Admitting: EMERGENCY MEDICINE
Payer: COMMERCIAL

## 2019-07-24 ENCOUNTER — APPOINTMENT (OUTPATIENT)
Dept: ULTRASOUND IMAGING | Facility: CLINIC | Age: 19
End: 2019-07-24
Attending: EMERGENCY MEDICINE
Payer: COMMERCIAL

## 2019-07-24 VITALS
BODY MASS INDEX: 17.63 KG/M2 | HEART RATE: 97 BPM | DIASTOLIC BLOOD PRESSURE: 81 MMHG | OXYGEN SATURATION: 99 % | WEIGHT: 119 LBS | RESPIRATION RATE: 18 BRPM | HEIGHT: 69 IN | SYSTOLIC BLOOD PRESSURE: 134 MMHG | TEMPERATURE: 99 F

## 2019-07-24 DIAGNOSIS — Z3A.01: ICD-10-CM

## 2019-07-24 DIAGNOSIS — Z34.91: ICD-10-CM

## 2019-07-24 DIAGNOSIS — R55 SYNCOPE, UNSPECIFIED SYNCOPE TYPE: ICD-10-CM

## 2019-07-24 LAB
ALBUMIN SERPL-MCNC: 4 G/DL (ref 3.4–5)
ALP SERPL-CCNC: 40 U/L (ref 40–150)
ALT SERPL W P-5'-P-CCNC: 16 U/L (ref 0–50)
ANION GAP SERPL CALCULATED.3IONS-SCNC: 8 MMOL/L (ref 3–14)
AST SERPL W P-5'-P-CCNC: 11 U/L (ref 0–45)
BASOPHILS # BLD AUTO: 0.1 10E9/L (ref 0–0.2)
BASOPHILS NFR BLD AUTO: 0.6 %
BILIRUB SERPL-MCNC: 0.4 MG/DL (ref 0.2–1.3)
BUN SERPL-MCNC: 11 MG/DL (ref 7–30)
CALCIUM SERPL-MCNC: 8.8 MG/DL (ref 8.5–10.1)
CHLORIDE SERPL-SCNC: 108 MMOL/L (ref 94–109)
CO2 SERPL-SCNC: 23 MMOL/L (ref 20–32)
CREAT SERPL-MCNC: 0.57 MG/DL (ref 0.52–1.04)
DIFFERENTIAL METHOD BLD: NORMAL
EOSINOPHIL # BLD AUTO: 0.1 10E9/L (ref 0–0.7)
EOSINOPHIL NFR BLD AUTO: 0.7 %
ERYTHROCYTE [DISTWIDTH] IN BLOOD BY AUTOMATED COUNT: 12.7 % (ref 10–15)
GFR SERPL CREATININE-BSD FRML MDRD: >90 ML/MIN/{1.73_M2}
GLUCOSE SERPL-MCNC: 77 MG/DL (ref 70–99)
HCG SERPL QL: POSITIVE
HCT VFR BLD AUTO: 37 % (ref 35–47)
HGB BLD-MCNC: 11.8 G/DL (ref 11.7–15.7)
IMM GRANULOCYTES # BLD: 0 10E9/L (ref 0–0.4)
IMM GRANULOCYTES NFR BLD: 0.2 %
LYMPHOCYTES # BLD AUTO: 2.4 10E9/L (ref 0.8–5.3)
LYMPHOCYTES NFR BLD AUTO: 29 %
MCH RBC QN AUTO: 29.1 PG (ref 26.5–33)
MCHC RBC AUTO-ENTMCNC: 31.9 G/DL (ref 31.5–36.5)
MCV RBC AUTO: 91 FL (ref 78–100)
MONOCYTES # BLD AUTO: 0.5 10E9/L (ref 0–1.3)
MONOCYTES NFR BLD AUTO: 6.7 %
NEUTROPHILS # BLD AUTO: 5.1 10E9/L (ref 1.6–8.3)
NEUTROPHILS NFR BLD AUTO: 62.8 %
NRBC # BLD AUTO: 0 10*3/UL
NRBC BLD AUTO-RTO: 0 /100
PLATELET # BLD AUTO: 278 10E9/L (ref 150–450)
POTASSIUM SERPL-SCNC: 3.9 MMOL/L (ref 3.4–5.3)
PROT SERPL-MCNC: 7.4 G/DL (ref 6.8–8.8)
RBC # BLD AUTO: 4.05 10E12/L (ref 3.8–5.2)
SODIUM SERPL-SCNC: 139 MMOL/L (ref 133–144)
WBC # BLD AUTO: 8.1 10E9/L (ref 4–11)

## 2019-07-24 PROCEDURE — 85025 COMPLETE CBC W/AUTO DIFF WBC: CPT | Performed by: EMERGENCY MEDICINE

## 2019-07-24 PROCEDURE — 99285 EMERGENCY DEPT VISIT HI MDM: CPT | Mod: 25 | Performed by: EMERGENCY MEDICINE

## 2019-07-24 PROCEDURE — 36415 COLL VENOUS BLD VENIPUNCTURE: CPT | Performed by: EMERGENCY MEDICINE

## 2019-07-24 PROCEDURE — 76801 OB US < 14 WKS SINGLE FETUS: CPT

## 2019-07-24 PROCEDURE — 93005 ELECTROCARDIOGRAM TRACING: CPT | Performed by: EMERGENCY MEDICINE

## 2019-07-24 PROCEDURE — 80053 COMPREHEN METABOLIC PANEL: CPT | Performed by: EMERGENCY MEDICINE

## 2019-07-24 PROCEDURE — 84703 CHORIONIC GONADOTROPIN ASSAY: CPT | Performed by: EMERGENCY MEDICINE

## 2019-07-24 PROCEDURE — 93010 ELECTROCARDIOGRAM REPORT: CPT | Mod: Z6 | Performed by: EMERGENCY MEDICINE

## 2019-07-24 RX ORDER — SODIUM CHLORIDE 9 MG/ML
1000 INJECTION, SOLUTION INTRAVENOUS CONTINUOUS
Status: DISCONTINUED | OUTPATIENT
Start: 2019-07-24 | End: 2019-07-24 | Stop reason: HOSPADM

## 2019-07-24 ASSESSMENT — MIFFLIN-ST. JEOR: SCORE: 1361.22

## 2019-07-24 NOTE — ED AVS SNAPSHOT
[facility] Emergency Department  [address] [address]  Phone:  [phone]  Fax:  [phone]                                    Pat Chaney   MRN: [id number]    Department:  [facility] Emergency Department   Date of Visit:  7/24/2019           After Visit Summary Signature Page    I have received my discharge instructions, and my questions have been answered. I have discussed any challenges I see with this plan with the nurse or doctor.    ..........................................................................................................................................  Patient/Patient Representative Signature      ..........................................................................................................................................  Patient Representative Print Name and Relationship to Patient    ..................................................               ................................................  Date                                   Time    ..........................................................................................................................................  Reviewed by Signature/Title    ...................................................              ..............................................  Date                                               Time          22EPIC Rev 08/18

## 2019-07-24 NOTE — ED PROVIDER NOTES
History     Chief Complaint   Patient presents with     Loss of Consciousness     syncope episode at home this a.m.  pt is , 6 weeks pg.  pt reports h/o iron def anemia during prior pg.  pt has not had first OBGYN apt yet.      HPI  Pat Chaney is a 19 year old female who is approximately 6 weeks pregnant by LMP 2019, confirmed with 3 home pregnancy test, who presents after a fainting episode at home.  She got up from watching TV while lying back on her bed with feet on the bed and developed lightheadedness and dizziness followed by brief LOC with fall to the floor without significant injury or trauma.  Return to baseline mentation and she feels that this episode was very brief.  No preceding chest pain, shortness breath, palpitations or other symptoms to suggest seizure.  Near syncopal episodes with prior pregnancy, but never fainted.  She states she had anemia with her previous pregnancy and had to take an iron supplement.  She has had no signs or symptoms of  or GI bleeding.  No cough, hemoptysis or leg pain or leg swelling  G2, P1.  No previous prenatal care with this pregnancy.    Previous Records Reviewed:  Hemoglobin   Date Value Ref Range Status   2019 11.8 11.7 - 15.7 g/dL Final   2018 12.7 11.7 - 15.7 g/dL Final   2007 11.3 10.5 - 14.0 g/dL Final         Allergies:  No Known Allergies    Problem List:    There are no active problems to display for this patient.       Past Medical History:    No past medical history on file.    Past Surgical History:    No past surgical history on file.    Family History:    Family History   Problem Relation Age of Onset     Asthma Maternal Grandmother      Arthritis Maternal Grandmother        Social History:  Marital Status:  Single [1]  Social History     Tobacco Use     Smoking status: Passive Smoke Exposure - Never Smoker     Smokeless tobacco: Never Used     Tobacco comment: outdoors   Substance Use Topics     Alcohol use: No      "Drug use: No        Medications:      APRI 0.15-30 MG-MCG tablet   ARIPiprazole (ABILIFY) 5 MG tablet   CONCERTA 36 MG OR TBCR         Review of Systems   Mils non-productive cough x several days (boyfriend just getting over a URI. No CP, hemoptysis, leg pain or leg swelling.  No s/sx of GI bleeding.  As mentioned above in the history present illness.  All other systems were reviewed and are negative.      Physical Exam   BP: 134/81  Pulse: 97  Temp: 99  F (37.2  C)  Resp: 18  Height: 174 cm (5' 8.5\")  Weight: 54 kg (119 lb)  SpO2: 99 %  Lying Orthostatic BP: 111/69  Lying Orthostatic Pulse: 78 bpm  Sitting Orthostatic BP: 118/74  Sitting Orthostatic Pulse: 74 bpm  Standing Orthostatic BP: 117/73  Standing Orthostatic Pulse: 83 bpm      Physical Exam   Constitutional: She is oriented to person, place, and time. She appears well-developed and well-nourished. No distress.   HENT:   Head: Normocephalic and atraumatic.   Mouth/Throat: Oropharynx is clear and moist.   Eyes: Pupils are equal, round, and reactive to light. Conjunctivae and EOM are normal. No scleral icterus.   Neck: Normal range of motion. Neck supple. No tracheal deviation present.   Cardiovascular: Normal rate, regular rhythm and normal heart sounds. Exam reveals no gallop and no friction rub.   No murmur heard.  Pulmonary/Chest: Effort normal and breath sounds normal. No respiratory distress. She has no wheezes. She has no rales.   Abdominal: Soft. Bowel sounds are normal. She exhibits no distension. There is no tenderness.   Musculoskeletal: Normal range of motion. She exhibits no edema or tenderness.   Neurological: She is alert and oriented to person, place, and time. No cranial nerve deficit (2-12 intact) or sensory deficit. She exhibits normal muscle tone. Coordination normal.   Skin: Skin is warm and dry. No rash noted. She is not diaphoretic. No erythema. No pallor.   Psychiatric: She has a normal mood and affect. Her behavior is normal. "   Nursing note and vitals reviewed.      ED Course        Procedures               EKG Interpretation:      Interpreted by Arben Keita MD  Time reviewed: Upon completion  Symptoms at time of EKG: Syncopal episode, now asymptomatic  Rhythm: normal sinus   Rate: normal  Axis: normal  Ectopy: none  Conduction: normal  ST Segments/ T Waves: No ST-T wave changes  Q Waves: none  Comparison to prior: No old EKG available  Clinical Impression: normal EKG           Results for orders placed or performed during the hospital encounter of 07/24/19 (from the past 24 hour(s))   CBC with platelets differential   Result Value Ref Range    WBC 8.1 4.0 - 11.0 10e9/L    RBC Count 4.05 3.8 - 5.2 10e12/L    Hemoglobin 11.8 11.7 - 15.7 g/dL    Hematocrit 37.0 35.0 - 47.0 %    MCV 91 78 - 100 fl    MCH 29.1 26.5 - 33.0 pg    MCHC 31.9 31.5 - 36.5 g/dL    RDW 12.7 10.0 - 15.0 %    Platelet Count 278 150 - 450 10e9/L    Diff Method Automated Method     % Neutrophils 62.8 %    % Lymphocytes 29.0 %    % Monocytes 6.7 %    % Eosinophils 0.7 %    % Basophils 0.6 %    % Immature Granulocytes 0.2 %    Nucleated RBCs 0 0 /100    Absolute Neutrophil 5.1 1.6 - 8.3 10e9/L    Absolute Lymphocytes 2.4 0.8 - 5.3 10e9/L    Absolute Monocytes 0.5 0.0 - 1.3 10e9/L    Absolute Eosinophils 0.1 0.0 - 0.7 10e9/L    Absolute Basophils 0.1 0.0 - 0.2 10e9/L    Abs Immature Granulocytes 0.0 0 - 0.4 10e9/L    Absolute Nucleated RBC 0.0    Comprehensive metabolic panel   Result Value Ref Range    Sodium 139 133 - 144 mmol/L    Potassium 3.9 3.4 - 5.3 mmol/L    Chloride 108 94 - 109 mmol/L    Carbon Dioxide 23 20 - 32 mmol/L    Anion Gap 8 3 - 14 mmol/L    Glucose 77 70 - 99 mg/dL    Urea Nitrogen 11 7 - 30 mg/dL    Creatinine 0.57 0.52 - 1.04 mg/dL    GFR Estimate >90 >60 mL/min/[1.73_m2]    GFR Estimate If Black >90 >60 mL/min/[1.73_m2]    Calcium 8.8 8.5 - 10.1 mg/dL    Bilirubin Total 0.4 0.2 - 1.3 mg/dL    Albumin 4.0 3.4 - 5.0 g/dL    Protein Total 7.4 6.8  - 8.8 g/dL    Alkaline Phosphatase 40 40 - 150 U/L    ALT 16 0 - 50 U/L    AST 11 0 - 45 U/L   HCG qualitative Blood   Result Value Ref Range    HCG Qualitative Serum Positive (A) NEG^Negative   US OB < 14 Weeks w Transvaginal    Narrative    ULTRASOUND OBSTETRIC FIRST TRIMESTER  7/24/2019 6:21 PM    HISTORY: Syncopal episode.    TECHNIQUE: Transabdominal imaging was performed.      COMPARISON:  None.    FINDINGS:      Estimated gestational age by current ultrasound measurement: Less than  5 weeks.  Crown-rump length: 0.1 cm.   Embryonic cardiac activity: None detected.   Yolk sac: Present. Unremarkable shape.  Subchorionic hemorrhage: 1.4 x 0.5 x 1.2 cm.  Amniotic fluid volume:  Unremarkable for gestational age.    Right ovary: Tiny cysts and/or corpus luteum noted.  Left ovary: Unremarkable.  Adnexal mass: None.  Free pelvic fluid: Small amount.      Impression    IMPRESSION: Gestational sac, yolk sac, and suspected tiny embryo too  small for dating or detecting cardiac activity. Small subchorionic  hemorrhage.       Medications   0.9% sodium chloride BOLUS (has no administration in time range)   sodium chloride 0.9% infusion (has no administration in time range)     Previous Records in Care Everywhere were Reviewed:  BLOOD BANK TESTING  Component Name  8/19/2014     ABO Rh: A Rh Positive   Antibody screen: Negative   ABORH                       SPECIMEN EXPIRATION DATE/TIME   ANTIBODY SCREEN       Assessments & Plan (with Medical Decision Making)   Syncopal episode consistent with vasovagal syncope and patient with early first trimester pregnancy without evidence of ectopic pregnancy, clinically or by pelvic ultrasound evaluation.  EKG and laboratory evaluation unremarkable.  Doubt PE/DVT, atypical ACS, dysrhythmia, seizure or other emergent disease process.  I recommend she follow-up in primary care clinic and schedule Ob/Gyn clinic follow-up.    I have reviewed the nursing notes.    I have reviewed the  findings, diagnosis, plan and need for follow up with the patient.       Medication List      There are no discharge medications for this visit.         Final diagnoses:   Syncope, unspecified syncope type - Probable vasovagal syncope   Pregnancy with fewer than 5 completed weeks gestation       7/24/2019   [facility] EMERGENCY DEPARTMENT     Arben Keita MD  07/24/19 9813

## 2019-11-08 ENCOUNTER — HEALTH MAINTENANCE LETTER (OUTPATIENT)
Age: 19
End: 2019-11-08

## 2020-02-23 ENCOUNTER — HEALTH MAINTENANCE LETTER (OUTPATIENT)
Age: 20
End: 2020-02-23

## 2020-03-13 ENCOUNTER — HOSPITAL ENCOUNTER (INPATIENT)
Facility: CLINIC | Age: 20
LOS: 1 days | Discharge: HOME | End: 2020-03-14
Attending: OBSTETRICS & GYNECOLOGY | Admitting: OBSTETRICS & GYNECOLOGY
Payer: COMMERCIAL

## 2020-03-13 ENCOUNTER — ANESTHESIA (OUTPATIENT)
Dept: OBGYN | Facility: CLINIC | Age: 20
End: 2020-03-13
Payer: COMMERCIAL

## 2020-03-13 ENCOUNTER — ANESTHESIA EVENT (OUTPATIENT)
Dept: OBGYN | Facility: CLINIC | Age: 20
End: 2020-03-13
Payer: COMMERCIAL

## 2020-03-13 PROBLEM — O28.3 ECHOGENIC INTRACARDIAC FOCUS OF FETUS ON PRENATAL ULTRASOUND: Status: ACTIVE | Noted: 2019-11-14

## 2020-03-13 PROBLEM — Z34.80 SUPERVISION OF OTHER NORMAL PREGNANCY: Status: ACTIVE | Noted: 2020-03-13

## 2020-03-13 PROBLEM — Z34.80 ENCOUNTER FOR SUPERVISION OF OTHER NORMAL PREGNANCY, UNSPECIFIED TRIMESTER: Status: ACTIVE | Noted: 2019-08-29

## 2020-03-13 PROBLEM — F31.81 BIPOLAR 2 DISORDER (H): Status: ACTIVE | Noted: 2019-01-22

## 2020-03-13 LAB
ABO + RH BLD: NORMAL
ABO + RH BLD: NORMAL
ALT SERPL W P-5'-P-CCNC: 10 U/L (ref 0–50)
AST SERPL W P-5'-P-CCNC: 16 U/L (ref 0–45)
BLD GP AB SCN SERPL QL: NORMAL
BLOOD BANK CMNT PATIENT-IMP: NORMAL
CREAT SERPL-MCNC: 0.46 MG/DL (ref 0.52–1.04)
ERYTHROCYTE [DISTWIDTH] IN BLOOD BY AUTOMATED COUNT: 12.8 % (ref 10–15)
GFR SERPL CREATININE-BSD FRML MDRD: >90 ML/MIN/{1.73_M2}
HCT VFR BLD AUTO: 33.3 % (ref 35–47)
HGB BLD-MCNC: 10.8 G/DL (ref 11.7–15.7)
MCH RBC QN AUTO: 31.4 PG (ref 26.5–33)
MCHC RBC AUTO-ENTMCNC: 32.4 G/DL (ref 31.5–36.5)
MCV RBC AUTO: 97 FL (ref 78–100)
PLATELET # BLD AUTO: 199 10E9/L (ref 150–450)
RBC # BLD AUTO: 3.44 10E12/L (ref 3.8–5.2)
SPECIMEN EXP DATE BLD: NORMAL
T PALLIDUM AB SER QL: NONREACTIVE
WBC # BLD AUTO: 19.1 10E9/L (ref 4–11)

## 2020-03-13 PROCEDURE — 25800030 ZZH RX IP 258 OP 636: Performed by: NURSE ANESTHETIST, CERTIFIED REGISTERED

## 2020-03-13 PROCEDURE — 84460 ALANINE AMINO (ALT) (SGPT): CPT | Performed by: OBSTETRICS & GYNECOLOGY

## 2020-03-13 PROCEDURE — 84450 TRANSFERASE (AST) (SGOT): CPT | Performed by: OBSTETRICS & GYNECOLOGY

## 2020-03-13 PROCEDURE — 12000000 ZZH R&B MED SURG/OB

## 2020-03-13 PROCEDURE — 85027 COMPLETE CBC AUTOMATED: CPT | Performed by: OBSTETRICS & GYNECOLOGY

## 2020-03-13 PROCEDURE — 25000132 ZZH RX MED GY IP 250 OP 250 PS 637: Performed by: OBSTETRICS & GYNECOLOGY

## 2020-03-13 PROCEDURE — 36415 COLL VENOUS BLD VENIPUNCTURE: CPT | Performed by: OBSTETRICS & GYNECOLOGY

## 2020-03-13 PROCEDURE — 25000128 H RX IP 250 OP 636: Performed by: NURSE ANESTHETIST, CERTIFIED REGISTERED

## 2020-03-13 PROCEDURE — 40000671 ZZH STATISTIC ANESTHESIA CASE

## 2020-03-13 PROCEDURE — 10907ZC DRAINAGE OF AMNIOTIC FLUID, THERAPEUTIC FROM PRODUCTS OF CONCEPTION, VIA NATURAL OR ARTIFICIAL OPENING: ICD-10-PCS | Performed by: OBSTETRICS & GYNECOLOGY

## 2020-03-13 PROCEDURE — 3E0S3BZ INTRODUCTION OF ANESTHETIC AGENT INTO EPIDURAL SPACE, PERCUTANEOUS APPROACH: ICD-10-PCS | Performed by: NURSE ANESTHETIST, CERTIFIED REGISTERED

## 2020-03-13 PROCEDURE — 86780 TREPONEMA PALLIDUM: CPT | Performed by: OBSTETRICS & GYNECOLOGY

## 2020-03-13 PROCEDURE — 37000011 ZZH ANESTHESIA WARD SERVICE: Performed by: NURSE ANESTHETIST, CERTIFIED REGISTERED

## 2020-03-13 PROCEDURE — 86900 BLOOD TYPING SEROLOGIC ABO: CPT | Performed by: OBSTETRICS & GYNECOLOGY

## 2020-03-13 PROCEDURE — 25000125 ZZHC RX 250

## 2020-03-13 PROCEDURE — 86850 RBC ANTIBODY SCREEN: CPT | Performed by: OBSTETRICS & GYNECOLOGY

## 2020-03-13 PROCEDURE — 88307 TISSUE EXAM BY PATHOLOGIST: CPT | Performed by: OBSTETRICS & GYNECOLOGY

## 2020-03-13 PROCEDURE — 88307 TISSUE EXAM BY PATHOLOGIST: CPT | Mod: 26 | Performed by: OBSTETRICS & GYNECOLOGY

## 2020-03-13 PROCEDURE — 72200001 ZZH LABOR CARE VAGINAL DELIVERY SINGLE

## 2020-03-13 PROCEDURE — 00HU33Z INSERTION OF INFUSION DEVICE INTO SPINAL CANAL, PERCUTANEOUS APPROACH: ICD-10-PCS | Performed by: NURSE ANESTHETIST, CERTIFIED REGISTERED

## 2020-03-13 PROCEDURE — 0UQMXZZ REPAIR VULVA, EXTERNAL APPROACH: ICD-10-PCS | Performed by: OBSTETRICS & GYNECOLOGY

## 2020-03-13 PROCEDURE — 0KQM0ZZ REPAIR PERINEUM MUSCLE, OPEN APPROACH: ICD-10-PCS | Performed by: OBSTETRICS & GYNECOLOGY

## 2020-03-13 PROCEDURE — 82565 ASSAY OF CREATININE: CPT | Performed by: OBSTETRICS & GYNECOLOGY

## 2020-03-13 PROCEDURE — 86901 BLOOD TYPING SEROLOGIC RH(D): CPT | Performed by: OBSTETRICS & GYNECOLOGY

## 2020-03-13 RX ORDER — BUPIVACAINE HYDROCHLORIDE 7.5 MG/ML
INJECTION, SOLUTION INTRASPINAL PRN
Status: DISCONTINUED | OUTPATIENT
Start: 2020-03-13 | End: 2020-03-13

## 2020-03-13 RX ORDER — OXYTOCIN/0.9 % SODIUM CHLORIDE 30/500 ML
100 PLASTIC BAG, INJECTION (ML) INTRAVENOUS CONTINUOUS
Status: DISCONTINUED | OUTPATIENT
Start: 2020-03-13 | End: 2020-03-14 | Stop reason: HOSPADM

## 2020-03-13 RX ORDER — SCOLOPAMINE TRANSDERMAL SYSTEM 1 MG/1
1 PATCH, EXTENDED RELEASE TRANSDERMAL ONCE
Status: DISCONTINUED | OUTPATIENT
Start: 2020-03-13 | End: 2020-03-13

## 2020-03-13 RX ORDER — EPINEPHRINE 1 MG/ML
INJECTION, SOLUTION, CONCENTRATE INTRAVENOUS PRN
Status: DISCONTINUED | OUTPATIENT
Start: 2020-03-13 | End: 2020-03-13

## 2020-03-13 RX ORDER — MISOPROSTOL 200 UG/1
400 TABLET ORAL ONCE
Status: COMPLETED | OUTPATIENT
Start: 2020-03-13 | End: 2020-03-13

## 2020-03-13 RX ORDER — NALBUPHINE HYDROCHLORIDE 10 MG/ML
2.5-5 INJECTION, SOLUTION INTRAMUSCULAR; INTRAVENOUS; SUBCUTANEOUS EVERY 6 HOURS PRN
Status: DISCONTINUED | OUTPATIENT
Start: 2020-03-13 | End: 2020-03-13 | Stop reason: RX

## 2020-03-13 RX ORDER — ASCORBIC ACID, CHOLECALCIFEROL, .ALPHA.-TOCOPHEROL ACETATE, DL-, PYRIDOXINE, FOLIC ACID, CYANOCOBALAMIN, CALCIUM, FERROUS FUMARATE, MAGNESIUM, DOCONEXENT 85; 200; 10; 25; 1; 12; 140; 27; 45; 300 [IU]/1; [IU]/1; [IU]/1; [IU]/1; MG/1; UG/1; MG/1; MG/1; MG/1; MG/1
1 CAPSULE, GELATIN COATED ORAL DAILY
Status: ON HOLD | COMMUNITY
Start: 2019-08-29 | End: 2020-03-13

## 2020-03-13 RX ORDER — PRENATAL VIT/IRON FUM/FOLIC AC 27MG-0.8MG
1 TABLET ORAL DAILY
COMMUNITY
End: 2021-01-07

## 2020-03-13 RX ORDER — OXYTOCIN/0.9 % SODIUM CHLORIDE 30/500 ML
PLASTIC BAG, INJECTION (ML) INTRAVENOUS
Status: COMPLETED
Start: 2020-03-13 | End: 2020-03-13

## 2020-03-13 RX ORDER — METHYLERGONOVINE MALEATE 0.2 MG/ML
200 INJECTION INTRAVENOUS
Status: DISCONTINUED | OUTPATIENT
Start: 2020-03-13 | End: 2020-03-14 | Stop reason: HOSPADM

## 2020-03-13 RX ORDER — AMOXICILLIN 250 MG
2 CAPSULE ORAL 2 TIMES DAILY
Status: DISCONTINUED | OUTPATIENT
Start: 2020-03-13 | End: 2020-03-14 | Stop reason: HOSPADM

## 2020-03-13 RX ORDER — OXYTOCIN 10 [USP'U]/ML
10 INJECTION, SOLUTION INTRAMUSCULAR; INTRAVENOUS
Status: DISCONTINUED | OUTPATIENT
Start: 2020-03-13 | End: 2020-03-14 | Stop reason: HOSPADM

## 2020-03-13 RX ORDER — LIDOCAINE 40 MG/G
CREAM TOPICAL
Status: DISCONTINUED | OUTPATIENT
Start: 2020-03-13 | End: 2020-03-13

## 2020-03-13 RX ORDER — HYDROCORTISONE 2.5 %
CREAM (GRAM) TOPICAL 3 TIMES DAILY PRN
Status: DISCONTINUED | OUTPATIENT
Start: 2020-03-13 | End: 2020-03-14 | Stop reason: HOSPADM

## 2020-03-13 RX ORDER — CARBOPROST TROMETHAMINE 250 UG/ML
250 INJECTION, SOLUTION INTRAMUSCULAR
Status: DISCONTINUED | OUTPATIENT
Start: 2020-03-13 | End: 2020-03-14 | Stop reason: HOSPADM

## 2020-03-13 RX ORDER — ONDANSETRON 4 MG/1
4 TABLET, ORALLY DISINTEGRATING ORAL EVERY 8 HOURS PRN
Status: DISCONTINUED | OUTPATIENT
Start: 2020-03-13 | End: 2020-03-13

## 2020-03-13 RX ORDER — IBUPROFEN 800 MG/1
800 TABLET, FILM COATED ORAL EVERY 6 HOURS PRN
Status: DISCONTINUED | OUTPATIENT
Start: 2020-03-13 | End: 2020-03-14 | Stop reason: HOSPADM

## 2020-03-13 RX ORDER — OXYTOCIN 10 [USP'U]/ML
10 INJECTION, SOLUTION INTRAMUSCULAR; INTRAVENOUS
Status: DISCONTINUED | OUTPATIENT
Start: 2020-03-13 | End: 2020-03-13

## 2020-03-13 RX ORDER — METHYLPHENIDATE HYDROCHLORIDE 36 MG/1
36 TABLET ORAL DAILY
Status: DISCONTINUED | OUTPATIENT
Start: 2020-03-13 | End: 2020-03-13

## 2020-03-13 RX ORDER — EPHEDRINE SULFATE 50 MG/ML
5 INJECTION, SOLUTION INTRAMUSCULAR; INTRAVENOUS; SUBCUTANEOUS
Status: DISCONTINUED | OUTPATIENT
Start: 2020-03-13 | End: 2020-03-13

## 2020-03-13 RX ORDER — CARBOPROST TROMETHAMINE 250 UG/ML
250 INJECTION, SOLUTION INTRAMUSCULAR
Status: DISCONTINUED | OUTPATIENT
Start: 2020-03-13 | End: 2020-03-13

## 2020-03-13 RX ORDER — BISACODYL 10 MG
10 SUPPOSITORY, RECTAL RECTAL DAILY PRN
Status: DISCONTINUED | OUTPATIENT
Start: 2020-03-15 | End: 2020-03-14 | Stop reason: HOSPADM

## 2020-03-13 RX ORDER — SODIUM CHLORIDE, SODIUM LACTATE, POTASSIUM CHLORIDE, CALCIUM CHLORIDE 600; 310; 30; 20 MG/100ML; MG/100ML; MG/100ML; MG/100ML
INJECTION, SOLUTION INTRAVENOUS CONTINUOUS
Status: DISCONTINUED | OUTPATIENT
Start: 2020-03-13 | End: 2020-03-13

## 2020-03-13 RX ORDER — OXYTOCIN/0.9 % SODIUM CHLORIDE 30/500 ML
100-340 PLASTIC BAG, INJECTION (ML) INTRAVENOUS CONTINUOUS PRN
Status: DISCONTINUED | OUTPATIENT
Start: 2020-03-13 | End: 2020-03-13

## 2020-03-13 RX ORDER — OXYTOCIN/0.9 % SODIUM CHLORIDE 30/500 ML
340 PLASTIC BAG, INJECTION (ML) INTRAVENOUS CONTINUOUS PRN
Status: DISCONTINUED | OUTPATIENT
Start: 2020-03-13 | End: 2020-03-14 | Stop reason: HOSPADM

## 2020-03-13 RX ORDER — BUPIVACAINE HYDROCHLORIDE 7.5 MG/ML
1 INJECTION, SOLUTION EPIDURAL; RETROBULBAR ONCE
Status: DISCONTINUED | OUTPATIENT
Start: 2020-03-13 | End: 2020-03-13

## 2020-03-13 RX ORDER — IBUPROFEN 800 MG/1
800 TABLET, FILM COATED ORAL
Status: DISCONTINUED | OUTPATIENT
Start: 2020-03-13 | End: 2020-03-13

## 2020-03-13 RX ORDER — FENTANYL CITRATE 50 UG/ML
25 INJECTION, SOLUTION INTRAMUSCULAR; INTRAVENOUS ONCE
Status: DISCONTINUED | OUTPATIENT
Start: 2020-03-13 | End: 2020-03-13

## 2020-03-13 RX ORDER — ONDANSETRON 2 MG/ML
4 INJECTION INTRAMUSCULAR; INTRAVENOUS EVERY 6 HOURS PRN
Status: DISCONTINUED | OUTPATIENT
Start: 2020-03-13 | End: 2020-03-13

## 2020-03-13 RX ORDER — LANOLIN 100 %
OINTMENT (GRAM) TOPICAL
Status: DISCONTINUED | OUTPATIENT
Start: 2020-03-13 | End: 2020-03-14 | Stop reason: HOSPADM

## 2020-03-13 RX ORDER — ACETAMINOPHEN 325 MG/1
650 TABLET ORAL EVERY 4 HOURS PRN
Status: DISCONTINUED | OUTPATIENT
Start: 2020-03-13 | End: 2020-03-13

## 2020-03-13 RX ORDER — ARIPIPRAZOLE 5 MG/1
5 TABLET ORAL DAILY
Status: DISCONTINUED | OUTPATIENT
Start: 2020-03-13 | End: 2020-03-13

## 2020-03-13 RX ORDER — ACETAMINOPHEN 325 MG/1
650 TABLET ORAL EVERY 4 HOURS PRN
Status: DISCONTINUED | OUTPATIENT
Start: 2020-03-13 | End: 2020-03-14 | Stop reason: HOSPADM

## 2020-03-13 RX ORDER — MISOPROSTOL 200 UG/1
400 TABLET ORAL
Status: COMPLETED | OUTPATIENT
Start: 2020-03-13 | End: 2020-03-13

## 2020-03-13 RX ORDER — NALOXONE HYDROCHLORIDE 0.4 MG/ML
.1-.4 INJECTION, SOLUTION INTRAMUSCULAR; INTRAVENOUS; SUBCUTANEOUS
Status: DISCONTINUED | OUTPATIENT
Start: 2020-03-13 | End: 2020-03-13

## 2020-03-13 RX ORDER — FENTANYL CITRATE 50 UG/ML
INJECTION, SOLUTION INTRAMUSCULAR; INTRAVENOUS PRN
Status: DISCONTINUED | OUTPATIENT
Start: 2020-03-13 | End: 2020-03-13

## 2020-03-13 RX ORDER — AMOXICILLIN 250 MG
1 CAPSULE ORAL 2 TIMES DAILY
Status: DISCONTINUED | OUTPATIENT
Start: 2020-03-13 | End: 2020-03-14 | Stop reason: HOSPADM

## 2020-03-13 RX ORDER — NALOXONE HYDROCHLORIDE 0.4 MG/ML
.1-.4 INJECTION, SOLUTION INTRAMUSCULAR; INTRAVENOUS; SUBCUTANEOUS
Status: DISCONTINUED | OUTPATIENT
Start: 2020-03-13 | End: 2020-03-14 | Stop reason: HOSPADM

## 2020-03-13 RX ORDER — OXYCODONE AND ACETAMINOPHEN 5; 325 MG/1; MG/1
1 TABLET ORAL
Status: DISCONTINUED | OUTPATIENT
Start: 2020-03-13 | End: 2020-03-13

## 2020-03-13 RX ORDER — MISOPROSTOL 200 UG/1
TABLET ORAL
Status: DISCONTINUED
Start: 2020-03-13 | End: 2020-03-13 | Stop reason: HOSPADM

## 2020-03-13 RX ORDER — METHYLERGONOVINE MALEATE 0.2 MG/ML
200 INJECTION INTRAVENOUS
Status: DISCONTINUED | OUTPATIENT
Start: 2020-03-13 | End: 2020-03-13

## 2020-03-13 RX ADMIN — SODIUM CHLORIDE, POTASSIUM CHLORIDE, SODIUM LACTATE AND CALCIUM CHLORIDE: 600; 310; 30; 20 INJECTION, SOLUTION INTRAVENOUS at 07:01

## 2020-03-13 RX ADMIN — Medication 100 ML/HR: at 08:20

## 2020-03-13 RX ADMIN — IBUPROFEN 800 MG: 800 TABLET ORAL at 13:50

## 2020-03-13 RX ADMIN — IBUPROFEN 800 MG: 800 TABLET ORAL at 19:42

## 2020-03-13 RX ADMIN — FENTANYL CITRATE 25 MCG: 50 INJECTION, SOLUTION INTRAMUSCULAR; INTRAVENOUS at 07:20

## 2020-03-13 RX ADMIN — SENNOSIDES AND DOCUSATE SODIUM 1 TABLET: 8.6; 5 TABLET ORAL at 13:52

## 2020-03-13 RX ADMIN — BUPIVACAINE HYDROCHLORIDE IN DEXTROSE 0.8 ML: 7.5 INJECTION, SOLUTION SUBARACHNOID at 07:20

## 2020-03-13 RX ADMIN — EPINEPHRINE 0.2 MG: 1 INJECTION, SOLUTION INTRAMUSCULAR; SUBCUTANEOUS at 07:20

## 2020-03-13 RX ADMIN — ACETAMINOPHEN 650 MG: 325 TABLET, FILM COATED ORAL at 21:18

## 2020-03-13 RX ADMIN — MISOPROSTOL 400 MCG: 200 TABLET ORAL at 08:22

## 2020-03-13 RX ADMIN — SENNOSIDES AND DOCUSATE SODIUM 1 TABLET: 8.6; 5 TABLET ORAL at 19:42

## 2020-03-13 RX ADMIN — MISOPROSTOL 400 MCG: 200 TABLET ORAL at 09:22

## 2020-03-13 ASSESSMENT — ACTIVITIES OF DAILY LIVING (ADL)
RETIRED_EATING: 0-->INDEPENDENT
FALL_HISTORY_WITHIN_LAST_SIX_MONTHS: YES
TRANSFERRING: 0-->INDEPENDENT
COGNITION: 0 - NO COGNITION ISSUES REPORTED
SWALLOWING: 0-->SWALLOWS FOODS/LIQUIDS WITHOUT DIFFICULTY
BATHING: 0-->INDEPENDENT
AMBULATION: 0-->INDEPENDENT
TOILETING: 0-->INDEPENDENT
NUMBER_OF_TIMES_PATIENT_HAS_FALLEN_WITHIN_LAST_SIX_MONTHS: 1
DRESS: 0-->INDEPENDENT
RETIRED_COMMUNICATION: 0-->UNDERSTANDS/COMMUNICATES WITHOUT DIFFICULTY

## 2020-03-13 NOTE — ANESTHESIA PROCEDURE NOTES
Peripheral nerve/Neuraxial procedure note : intrathecal  Pre-Procedure    Location: OB    Procedure Times:3/13/2020 7:10 AM and 3/13/2020 7:30 AM  Pre-Anesthestic Checklist: patient identified, IV checked, site marked, risks and benefits discussed, informed consent, monitors and equipment checked, pre-op evaluation and at physician/surgeon's request    Timeout  Correct Patient: Yes   Correct Procedure: Yes   Correct Site: Yes   Correct Laterality: N/A   Correct Position: Yes   Site Marked: N/A   .   Procedure Documentation  ASA 2  .    Procedure: intrathecal, .   Patient Position:sitting Insertion Site:L3-4  (midline approach)     Patient Prep/Sterile Barriers; mask, sterile gloves, povidone-iodine 7.5% surgical scrub, patient draped.  .  Needle:  Spinal Needle (gauge): 25  Spinal/LP Needle Length (inches): 3.5 .        Assessment/Narrative  Paresthesias: No.  .  .  clear CSF fluid removed . Time Injected: 07:20  Comments:  VAS pain score prior to injection:    VAS pain score after injection:    FHR stable, pt. Tolerated well.

## 2020-03-13 NOTE — PLAN OF CARE
Problem: Adult Inpatient Plan of Care  Goal: Plan of Care Review  3/13/2020 1145 by Lynne Pendleton RN  Outcome: Improving     Pt up tolerating activity. She voided without problems. Transferred to  room 2042 per ambulation.

## 2020-03-13 NOTE — ANESTHESIA POSTPROCEDURE EVALUATION
Patient: Pat Chaney    * No procedures listed *    Diagnosis:* No pre-op diagnosis entered *  Diagnosis Additional Information: No value filed.    Anesthesia Type:  Spinal    Note:  Anesthesia Post Evaluation    Patient location during evaluation: Bedside  Patient participation: Able to fully participate in evaluation  Level of consciousness: awake  Airway patency: patent  Cardiovascular status: acceptable  Respiratory status: acceptable  Hydration status: acceptable     Anesthetic complications: None          Last vitals:  Vitals:    03/13/20 0650   BP: 133/83   Resp: 18   Temp: 36.4  C (97.6  F)         Electronically Signed By: KIRBY Amor CRNA  March 13, 2020  7:55 AM

## 2020-03-13 NOTE — ANESTHESIA CARE TRANSFER NOTE
Patient: Pat Chaney    * No procedures listed *    Diagnosis: * No pre-op diagnosis entered *  Diagnosis Additional Information: No value filed.    Anesthesia Type:   Spinal     Note:  Airway :Room Air  Patient transferred to:Phase II  Handoff Report: Identifed the Patient, Identified the Reponsible Provider, Reviewed the pertinent medical history, Discussed the surgical course, Reviewed Intra-OP anesthesia mangement and issues during anesthesia, Set expectations for post-procedure period and Allowed opportunity for questions and acknowledgement of understanding      Vitals: (Last set prior to Anesthesia Care Transfer)              Electronically Signed By: KIRBY Amor CRNA  March 13, 2020  7:55 AM

## 2020-03-13 NOTE — L&D DELIVERY NOTE
Pat Chaney is a 19 year old  who presented in active labor. She was augmented with AROM and progressed to complete. She began pushing with excellent descent and delivered a baby girl in OA position. APGARs 9 and 9.  Weight is 6 lbs 14 oz. Placenta delivered spontaneously and appeared intact but with some old clotted blood present, will send to pathology given concern of small abruption as patient also had a fair amount of bloody fluid per report. Patient examined and noted to have second degree laceration, repaired in usual fashion. Also had small right periurethral lac that was repaired with two interrupted 4-0 vicryl sutures for hemostasis. . Buccal miso given for ppx given expeditious delivery.    Jacqueline Billingsley MD     OB Vaginal Delivery Note    Pat Chaney MRN# [id number]   Age: 19 year old YOB: 2000       GA: Unknown  GP:   Labor Complications: None   EBL:   mL  Delivery QBL:    Delivery Type: Vaginal, Spontaneous   ROM to Delivery Time: (Delivered) Minutes: 23  [place] Weight: 3.118 kg (6 lb 14 oz)    1 Minute 5 Minute 10 Minute   Apgar Totals: 9   9        JHONY CAMPOS;VERÓNICA TILLMAN;JACQUELINE BILLINGSLEY     Delivery Details:  Pat Chaney, a 19 year old  female delivered a viable infant with apgars of 9  and 9 . Patient was fully dilated and pushing after 7  hours 14  minutes in active labor. Delivery was via vaginal, spontaneous  to a sterile field under intrathecal  anesthesia. Infant delivered in vertex    occiput  anterior  position. Anterior and posterior shoulders delivered without difficulty. The cord was clamped, cut twice and 3 vessels  were noted. Cord blood was obtained in routine fashion with the following disposition: lab .      Cord complications: none   Placenta delivered at 3/13/2020  8:28 AM . Placental disposition was Pathology . Fundal massage performed and fundus found to be firm.     Episiotomy: none     Perineum, vagina, cervix were inspected, and the following lacerations were noted:   Perineal lacerations: 2nd   periurethral laceration: right             Any lacerations were repaired in the usual fashion using 3-0 and 4-0 in standard fashion.     Excellent hemostasis was noted. Needle count correct. Infant and patient in delivery room in good and stable condition.        Labor Event Times    Labor onset date:  3/13/20 Onset time:   1:00 AM   Dilation complete date:  3/13/20 Complete time:   8:14 AM   Start pushing date/time:  3/13/2020 0814      Labor Length    1st Stage (hrs):  7 (min):  14   2nd Stage (hrs):  0 (min):  4   3rd Stage (hrs):  0 (min):  9      Labor Events     labor?:  No   steroids:  None  Labor Type:  Spontaneous  Predominate monitoring during 1st stage:  continuous electronic fetal monitoring     Antibiotics received during labor?:  No     Rupture date/time: 3/13/20 0755   Rupture type:  Artificial Rupture of Membranes  Fluid color:  Bloody  Fluid odor:  Normal     1:1 continuous labor support provided by?:  RN       Delivery/Placenta Date and Time    Delivery Date:  3/13/20 Delivery Time:   8:18 AM   Placenta Date/Time:  3/13/2020  8:28 AM     Vaginal Counts          Needles Suture Tad Sponges Instruments   Initial counts 2  5    Added to count  2     Final counts       Placed during labor Accounted for at the end of labor   NA NA   NA NA   NA NA    Final count performed by 2 team members:   Two Team Members   Jose Billingsley      Final count correct?:  Yes     Apgars    Living status:  Living   1 Minute 5 Minute 10 Minute 15 Minute 20 Minute   Skin color: 1  1       Heart rate: 2  2       Reflex irritability: 2  2       Muscle tone: 2  2       Respiratory effort: 2  2       Total: 9  9       Apgars assigned by:  JHONY CAMPOS     Cord    Vessels:  3 Vessels Complications:  None   Cord Blood Disposition:  Lab Gases Sent?:  No       Resuscitation    Methods:   "None     [place] Measurements    Weight:  6 lb 14 oz Length:  1' 7\"   Head circumference:  34.9 cm       Skin to Skin and Feeding Plan    Skin to skin initiation date/time: 1/3/1841    Skin to skin with:  Mother  Skin to skin end date/time:     How do you plan to feed your baby:  Breastfeeding     Labor Events and Shoulder Dystocia    Fetal Tracing Prior to Delivery:  Category 1  Shoulder dystocia present?:  Neg     Delivery (Maternal) (Provider to Complete) ([id number])    Episiotomy:  None  Perineal lacerations:  2nd Repaired?:  Yes   Periurethral laceration:  right Repaired?:  Yes      Blood Loss  Mother: Neisha Chaneyline E #[id number]   Start of Mother's Information    IO Blood Loss  20 0100 - 20 0903    None           End of Mother's Information  Mother: Pat Chaney E #[id number]         Delivery - Provider to Complete ([id number])    Delivering clinician:  Jacqueline Billingsley MD  Attempted Delivery Types (Choose all that apply):  Spontaneous Vaginal Delivery  Delivery Type (Choose the 1 that will go to the Birth History):  Vaginal, Spontaneous   Other personnel:   Provider Role   Lynne Pendleton RN Wilkie, Cindy, RN [place], Stephanie Marie, MD          Placenta    Delayed Cord Clamping:  Done  Date/Time:  3/13/2020  8:28 AM  Removal:  Spontaneous  Disposition:  Pathology     Anesthesia    Method:  Intrathecal          Presentation and Position    Presentation:  Vertex   Occiput Anterior           Jacqueline Billingsley MD  "

## 2020-03-13 NOTE — PLAN OF CARE
Pt complete/+2 station, Dr Billingsley notified to come for delivery. She was in department and came to room immediately.

## 2020-03-13 NOTE — PROGRESS NOTES
Pt arrived via EMS.  Pt was at [facility] until 3am for r/o labor.  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  Pt denies cigarette, alcohol or street drug usage.    S:Patient presents due to  vaginal bleeding and frequent contractions.  B:Unknown   Allergies: Patient has no known allergies.  A:A:moderate variablility, + accels, no decels, Category I  normal uterine activity  anterior and dilated to 9    Dr. MARIELA Fay called and orders received.  Plan includes; OWEN Guillaume called to place ITN. Reviewed with patient and she agrees with plan.   Bill of Rights given & questions discussed?: Yes  HC Your Rights handout : Informed and given    Prenatal Breastfeeding Education Toolkit provided for patient to review,helping her to make an informed decision on a feeding choice for her baby. Patient accepted. Questions answered.    Oriented to room and call light.    Report given to Lynne FIGUEROA @ [id number]

## 2020-03-13 NOTE — PLAN OF CARE
Problem: Adult Inpatient Plan of Care  Goal: Plan of Care Review  3/13/2020 1132 by Lynne Pendleton RN  Outcome: Improving     S:Delivery  B:Spontaneous Labor,Unknown    No results found for: GBS with antibiotic treatment not indicated 4 hours prior to delivery.  A: Patient delivered   lac 2nd degree at 0818 with Dr. FALGUNI Billingsley in attendance and baby placed on mother's abdomen for delayed cord clamping. Baby dried and stimulated. Baby placed  skin to skin @ 0819.. Apgars 9/9.  IV infusion of Oxytocin  infused. Placenta removal spontaneous. MD does want placenta sent to pathology.   ml and documented on flowsheet. See Flowsheet for VS and PP checks.  .  Labor care plan goals met, transition now to postpartum care.  R: Expect routine postpartum care. Anticipate first feeding within the hour or whenever infant displays feeding cues. Continue skin to skin. Prior discussion with mother indicates that feeding plan is Formula feeding and Pumping and bottle feeding EBM. Educated mother on importance of exclusive breastfeeding, expected feeding readiness cues and encouraged her to observe for these cues while rooming in. Informed her that breastfeeding assistance would be provided.

## 2020-03-13 NOTE — ANESTHESIA PREPROCEDURE EVALUATION
Anesthesia Pre-Procedure Evaluation    Patient: Pat Chaney   MRN: [id number] : 2000          Preoperative Diagnosis: * No pre-op diagnosis entered *    * No procedures listed *    No past medical history on file.  No past surgical history on file.    Anesthesia Evaluation       history and physical reviewed .      No history of anesthetic complications          ROS/MED HX    ENT/Pulmonary:  - neg pulmonary ROS     Neurologic:  - neg neurologic ROS     Cardiovascular:  - neg cardiovascular ROS       METS/Exercise Tolerance:     Hematologic:  - neg hematologic  ROS       Musculoskeletal:  - neg musculoskeletal ROS       GI/Hepatic:  - neg GI/hepatic ROS       Renal/Genitourinary:  - ROS Renal section negative       Endo:  - neg endo ROS       Psychiatric:  - neg psychiatric ROS       Infectious Disease:  - neg infectious disease ROS       Malignancy:      - no malignancy   Other:    - neg other ROS                 neg OB ROS            Physical Exam  Normal systems: cardiovascular, pulmonary and dental    Airway   Mallampati: I  TM distance: > 3 FB  Neck ROM: full  Mouth opening: > 3 cm    Dental     Cardiovascular       Pulmonary             Lab Results   Component Value Date    WBC 8.1 2019    HGB 11.8 2019    HCT 37.0 2019     2019     2019    POTASSIUM 3.9 2019    CHLORIDE 108 2019    CO2 23 2019    BUN 11 2019    CR 0.57 2019    GLC 77 2019    MARIA D 8.8 2019    PHOS Charge credited 2007    ALBUMIN 4.0 2019    PROTTOTAL 7.4 2019    ALT 16 2019    AST 11 2019    ALKPHOS 40 2019    BILITOTAL 0.4 2019    PTT 32 2007    INR 1.16 (H) 2007    HCG Negative 2018    HCGS Positive (A) 2019       Preop Vitals  BP Readings from Last 3 Encounters:   19 134/81   19 127/77   19 133/68    Pulse Readings from Last 3 Encounters:   19 97   19  "96   01/28/19 93      Resp Readings from Last 3 Encounters:   07/24/19 18   03/19/19 16   01/28/19 16    SpO2 Readings from Last 3 Encounters:   07/24/19 99%   03/19/19 99%   01/28/19 100%      Temp Readings from Last 1 Encounters:   07/24/19 37.2  C (99  F) (Temporal)    Ht Readings from Last 1 Encounters:   07/24/19 1.74 m (5' 8.5\")      Wt Readings from Last 1 Encounters:   07/24/19 54 kg (119 lb)    Estimated body mass index is 17.83 kg/m  as calculated from the following:    Height as of 7/24/19: 1.74 m (5' 8.5\").    Weight as of 7/24/19: 54 kg (119 lb).       Anesthesia Plan      History & Physical Review  History and physical reviewed and following examination; no interval change.    ASA Status:  2 .  OB Epidural Asa: 2       Plan for Spinal   PONV prophylaxis:  Ondansetron (or other 5HT-3) and Dexamethasone or Solumedrol         Postoperative Care  Postoperative pain management:  IV analgesics, Oral pain medications and Multi-modal analgesia.      Consents  Anesthetic plan, risks, benefits and alternatives discussed with:  Patient..                 KIRBY Powers CRNA  "

## 2020-03-13 NOTE — H&P
[facility] Labor and Delivery History and Physical    Pat Chaney MRN# [id number]   Age: 19 year old YOB: 2000     Date of Admission:  3/13/2020    Primary care provider: Aura           Chief Complaint:   Pat Chaney is a 19 year old female who is @ term pregnant and being admitted for active labor management.          Pregnancy history:     OBSTETRIC HISTORY:    OB History    Para Term  AB Living   2 1 1 0 0 0   SAB TAB Ectopic Multiple Live Births   0 0 0 0 0      # Outcome Date GA Lbr Adalberto/2nd Weight Sex Delivery Anes PTL Lv   2 Current            1 Term 04/08/15 40w6d  3.38 kg (7 lb 7.2 oz) F          Birth Comments: Lina      Name: Lina      Apgar1: 8  Apgar5: 9       EDC: Estimated Date of Delivery: None noted.    Prenatal Labs:   Lab Results   Component Value Date    HGB 11.8 2019    HIV Negative 2007       GBS Status:   No results found for: GBS    Active Problem List  Patient Active Problem List   Diagnosis     Encounter for supervision of other normal pregnancy, unspecified trimester     Attention deficit hyperactivity disorder (ADHD)     Bipolar 2 disorder (H)     Echogenic intracardiac focus of fetus on prenatal ultrasound       Medication Prior to Admission  Medications Prior to Admission   Medication Sig Dispense Refill Last Dose     APRI 0.15-30 MG-MCG tablet Take 1 tablet by mouth daily Continuously  2      ARIPiprazole (ABILIFY) 5 MG tablet Take 5 mg by mouth daily 1 daily for 2 weeks, then 2 daily        CONCERTA 36 MG OR TBCR Take 1 tablet by mouth daily        Prenat w/o G-HI-Okkzhib-FA-DHA (PNV-DHA) 27-0.6-0.4-300 MG CAPS Take 1 tablet by mouth daily        Prenatal Vit-Fe Fumarate-FA (PRENATAL MULTIVITAMIN W/IRON) 27-0.8 MG tablet Take 1 tablet by mouth daily      .        Maternal Past Medical History:   History reviewed. No pertinent past medical history.                    Family History:     Family History   Problem  Relation Age of Onset     Asthma Maternal Grandmother      Arthritis Maternal Grandmother      Family history reviewed and updated in [facility]            Social History:     Social History     Tobacco Use     Smoking status: Former Smoker     Types: Vaping Device     Smokeless tobacco: Never Used     Tobacco comment: outdoors   Substance Use Topics     Alcohol use: No            Review of Systems:   The Review of Systems is negative other than noted in the HPI          Physical Exam:   Vitals were reviewed  Temp: 97.6  F (36.4  C) Temp src: Oral BP: 126/88     Resp: 18 SpO2: 98 %      Constitutional:   awake, alert, cooperative, painfully bob, and appears stated age     Lungs:   No increased work of breathing, good air exchange, clear to auscultation bilaterally, no crackles or wheezing     Cardiovascular:   normal S1 and S2      Cervix:   Membranes: AROM   Dilation: 7   Effacement: 100%   Station:0   Consistency: soft   Position: Mid  Presentation:Cephalic  Fetal Heart Rate Tracing: reactive and reassuring, Tier 1 (normal)  Tocometer: external monitor and frequency q 2 minutes                       Assessment:   Pat Chaney is a Unknown pregnant female admitted with active labor management.          Plan:   Admit - see IP orders  Anticipate   Prenatal records through Care Everywhere    Poly Fay MD

## 2020-03-14 VITALS
WEIGHT: 144.62 LBS | HEART RATE: 65 BPM | OXYGEN SATURATION: 99 % | HEIGHT: 68 IN | BODY MASS INDEX: 21.92 KG/M2 | SYSTOLIC BLOOD PRESSURE: 125 MMHG | RESPIRATION RATE: 16 BRPM | TEMPERATURE: 97.6 F | DIASTOLIC BLOOD PRESSURE: 73 MMHG

## 2020-03-14 LAB — HGB BLD-MCNC: 9.7 G/DL (ref 11.7–15.7)

## 2020-03-14 PROCEDURE — 85018 HEMOGLOBIN: CPT | Performed by: OBSTETRICS & GYNECOLOGY

## 2020-03-14 PROCEDURE — 36415 COLL VENOUS BLD VENIPUNCTURE: CPT | Performed by: OBSTETRICS & GYNECOLOGY

## 2020-03-14 PROCEDURE — 25000132 ZZH RX MED GY IP 250 OP 250 PS 637: Performed by: OBSTETRICS & GYNECOLOGY

## 2020-03-14 PROCEDURE — 59410 OBSTETRICAL CARE: CPT | Performed by: OBSTETRICS & GYNECOLOGY

## 2020-03-14 RX ORDER — IBUPROFEN 600 MG/1
600 TABLET, FILM COATED ORAL EVERY 6 HOURS PRN
Qty: 30 TABLET | Refills: 0 | Status: SHIPPED | OUTPATIENT
Start: 2020-03-14

## 2020-03-14 RX ORDER — ACETAMINOPHEN 325 MG/1
325-650 TABLET ORAL EVERY 6 HOURS PRN
Qty: 30 TABLET | Refills: 0 | Status: SHIPPED | OUTPATIENT
Start: 2020-03-14

## 2020-03-14 RX ORDER — SENNA AND DOCUSATE SODIUM 50; 8.6 MG/1; MG/1
1-2 TABLET, FILM COATED ORAL 2 TIMES DAILY PRN
Qty: 30 TABLET | Refills: 0 | Status: SHIPPED | OUTPATIENT
Start: 2020-03-14 | End: 2021-01-07

## 2020-03-14 RX ORDER — FERROUS SULFATE 325(65) MG
325 TABLET, DELAYED RELEASE (ENTERIC COATED) ORAL DAILY
Qty: 60 TABLET | Refills: 2 | Status: SHIPPED | OUTPATIENT
Start: 2020-03-14

## 2020-03-14 RX ADMIN — IBUPROFEN 800 MG: 800 TABLET ORAL at 07:51

## 2020-03-14 RX ADMIN — SENNOSIDES AND DOCUSATE SODIUM 1 TABLET: 8.6; 5 TABLET ORAL at 07:51

## 2020-03-14 RX ADMIN — IBUPROFEN 800 MG: 800 TABLET ORAL at 02:07

## 2020-03-14 ASSESSMENT — MIFFLIN-ST. JEOR: SCORE: 1469.5

## 2020-03-14 NOTE — DISCHARGE SUMMARY
[facility] Vaginal Delivery Discharge Summary    Admit date: 3/13/2020  Discharge date: 3/14/2020     Admit Dx:   - 19 year old y/o  in labor  - bipolar disorder    Discharge Dx:  - Same as above, s/p   -g HTN (elevated BP at OSH and again at our hospital >4 hours later)    Procedures:  -       Admit HPI:  Ms. Pat Chaney is a 19 year old  who presented in labor  Please see her admit H&P for full details of her PMH, PSH, Meds, Allergies and exam on admit.    Hospital course:  Pat Chaney is a 19 year old  who presented in active labor. She was augmented with AROM and progressed to complete. She began pushing with excellent descent and delivered a baby girl in OA position. APGARs 9 and 9.  Weight is 6 lbs 14 oz. Placenta delivered spontaneously and appeared intact but with some old clotted blood present, will send to pathology given concern of small abruption as patient also had a fair amount of bloody fluid per report. Patient examined and noted to have second degree laceration, repaired in usual fashion. Also had small right periurethral lac that was repaired with two interrupted 4-0 vicryl sutures for hemostasis. . Buccal miso given for ppx given expeditious delivery.    Her postpartum course was uncomplicated. On PPD#1, she was meeting all of her postpartum goals and deemed stable for discharge. She was voiding without difficulty, tolerating a regular diet without nausea and vomiting, her pain was well controlled on oral pain medicines and her lochia was appropriate. Her hemoglobin after delivery was 9.7. Her Rh status was + and Rhogam was not indicated. At the time of discharge, she will be  breastfeeding her infant and abstinance for contraception.     Physical exam on the day of discharge:  Vitals:    20 1606 20 1831 20 1944 20 0210   BP: 118/73  115/73 111/74   Pulse: 62  82 64   Resp: 16   18   Temp: 97.5  F (36.4  C)    97.5  F (36.4  C)   TempSrc: Oral   Oral   SpO2:   97% 99%   Weight:  66.4 kg (146 lb 6.2 oz)       General: sitting up, alert and cooperative  Heart: reg rate, well perfused  Lungs: no increased work of breathing  Abd: soft, non-distended, non-tender. Fundus firm, nontender, below umbilicus.   Extremities: calves nontender, trace edema of lower extremities bilaterally    Lab Results   Component Value Date    HGB 9.7 03/14/2020    HGB 10.8 03/13/2020     Blood type:   Lab Results   Component Value Date    RH Pos 03/13/2020       Discharge/Disposition:  Pat Chaney was discharged to home in stable condition with the following instructions/medications:  1) Call for temperature > 100.4, foul smelling vaginal discharge, bleeding > 1 pad per hour x 2 hrs, pain not controlled by oral pain meds, severe constipation or severe nausea or vomiting.  2) She received contraceptive counseling.  3) She was instructed to follow-up with her primary OB in 6 weeks for a routine postpartum visit and BP and mood check in 2-3 days  4) She was instructed to continue her PNV on discharge if she wished to breast feed her infant.  5) She was discharged home with the following medications: tylenol, ibuprofen, senna s, iron     Demarco Cortes MD

## 2020-03-14 NOTE — PLAN OF CARE
Patient progressing well.  Ambulating, Eating and voiding well. Independent with mother/baby cares. Bonding well with infant. Bottle feeding is going well, infant is tolerating formula. Patient also pumps and bottle feeds breast milk when she gets it.  Patient rates Back and  mild Headache pain 1/10.  Taking Ibuprofen when due with good relief.  Encouraged patient to call for tylenol if needed between Ibuprofen.  Patient agreed to call if she needed tylenol.   Made plan with patient to bring Ibuprofen when due. Patient encouraged to report increased bleeding or clots.  Significant other is rooming in and is supportive and helpful.

## 2020-03-14 NOTE — DISCHARGE INSTRUCTIONS
Postpartum Vaginal Delivery Instructions    Activity       Ask family and friends for help when you need it.    Do not place anything in your vagina for 6 weeks.    You are not restricted on other activities, but take it easy for a few weeks to allow your body to recover from delivery.  You are able to do any activities you feel up to that point.    No driving until you have stopped taking your pain medications (usually two weeks after delivery).     Call your health care provider if you have any of these symptoms:       Increased pain, swelling, redness, or fluid around your stiches from an episiotomy or perineal tear.    A fever above 100.4 F (38 C) with or without chills when placing a thermometer under your tongue.    You soak a sanitary pad with blood within 1 hour, or you see blood clots larger than a golf ball.    Bleeding that lasts more than 6 weeks.    Vaginal discharge that smells bad.    Severe pain, cramping or tenderness in your lower belly area.    A need to urinate more frequently (use the toilet more often), more urgently (use the toilet very quickly), or it burns when you urinate.    Nausea and vomiting.    Redness, swelling or pain around a vein in your leg.    Problems breastfeeding or a red or painful area on your breast.    Chest pain and cough or are gasping for air.    Problems coping with sadness, anxiety, or depression.  If you have any concerns about hurting yourself or the baby, call your provider immediately.     You have questions or concerns after you return home.     Keep your hands clean:  Always wash your hands before touching your perineal area and stitches.  This helps reduce your risk of infection.  If your hands aren't dirty, you may use an alcohol hand-rub to clean your hands. Keep your nails clean and short.      If you you feel sad, have difficulty sleeping, feel overwhelmed, anxious or have troubling thoughts you may call your clinic, or the HelpLine for Pregnancy &  Postpartum Support [place]. ([facility] HelpLine 682-257-4276) or online resource list  @ www.ppsupport[place].org

## 2020-03-14 NOTE — DISCHARGE SUMMARY
Physician Discharge Summary     Patient ID:  Pat Chaney  [id number]  19 year old  2000    Admit date: 3/13/2020    Discharge date and time: 3/14/2020     Admitting Physician: Jacqueline Billingsley MD     Discharge Physician: Demarco Cortes MD     Admission Diagnoses: Supervision of other normal pregnancy   (spontaneous vaginal delivery)    Discharge Diagnoses: same    Admission Condition: good    Discharged Condition: good    Indication for Admission: vaginal bleeding at term    Hospital Course: Pat Chaney is a 19 year old  who presented via Ambulance  in active labor with vaginal bleeding. She was augmented with AROM and progressed to complete. She began pushing with excellent descent and delivered a baby girl in OA position. APGARs 9 and 9.  Weight is 6 lbs 14 oz. Placenta delivered spontaneously and appeared intact but with some old clotted blood present, will send to pathology given concern of small abruption as patient also had a fair amount of bloody fluid per report. Patient examined and noted to have second degree laceration, repaired in usual fashion. Also had small right periurethral lac that was repaired with two interrupted 4-0 vicryl sutures for hemostasis. . Buccal miso given for ppx given expeditious delivery.       Consults: none    Significant Diagnostic Studies: labs: pre eclampsia labs     Treatments: none    Discharge Exam:  /73   Pulse 65   Temp 97.6  F (36.4  C) (Oral)   Resp 16   Wt 66.4 kg (146 lb 6.2 oz)   SpO2 99%   Breastfeeding Unknown   BMI 21.93 kg/m    Exam:  Constitutional: healthy, alert and no distress    Gastrointestinal: Abdomen soft, non-tender. BS normal. No masses, organomegaly FF@U-2  : Deferred  Musculoskeletal: extremities normal- no gross deformities noted, gait normal and normal muscle tone  Skin: no suspicious lesions or rashes  Neurologic: Gait normal. Reflexes normal and symmetric. Sensation grossly  WNL.  Psychiatric: mentation appears normal and affect normal/bright      Disposition: home    Patient Instructions:   Current Discharge Medication List      START taking these medications    Details   acetaminophen (TYLENOL) 325 MG tablet Take 1-2 tablets (325-650 mg) by mouth every 6 hours as needed for mild pain  Qty: 30 tablet, Refills: 0    Associated Diagnoses:  (spontaneous vaginal delivery)      ferrous sulfate (FE TABS) 325 (65 Fe) MG EC tablet Take 1 tablet (325 mg) by mouth daily  Qty: 60 tablet, Refills: 2    Associated Diagnoses:  (spontaneous vaginal delivery)      ibuprofen (ADVIL/MOTRIN) 600 MG tablet Take 1 tablet (600 mg) by mouth every 6 hours as needed for moderate pain  Qty: 30 tablet, Refills: 0    Associated Diagnoses:  (spontaneous vaginal delivery)      SENNA-docusate sodium (SENNA S) 8.6-50 MG tablet Take 1-2 tablets by mouth 2 times daily as needed (constipation)  Qty: 30 tablet, Refills: 0    Associated Diagnoses:  (spontaneous vaginal delivery)         CONTINUE these medications which have NOT CHANGED    Details   ARIPiprazole (ABILIFY) 5 MG tablet Take 10 mg by mouth daily       CONCERTA 36 MG OR TBCR Take 36 mg by mouth every morning       Prenatal Vit-Fe Fumarate-FA (PRENATAL MULTIVITAMIN W/IRON) 27-0.8 MG tablet Take 1 tablet by mouth daily           Activity: Pelvic rest for the next 6 weeks   Diet: regular diet  Wound Care: keep wound clean and dry    Follow-up with Aura Dinh in 6 weeks.    Signed:  Demarco Cortes MD  3/14/2020  8:58 AM

## 2020-03-14 NOTE — PLAN OF CARE
Problem: Adult Inpatient Plan of Care  Goal: Plan of Care Review  3/14/2020 1245 by Lynne Pendleton, RN  Outcome: Completed     Patient discharged per ambulatory with infant in car seat. Mother verified that her band matches her infant's band by comparing the infant's  band#.  Discharge instructions given. Encouraged to call for any problems, questions or concerns. RXs filled and picked up at [facility].

## 2020-03-14 NOTE — PLAN OF CARE
Problem: Adult Inpatient Plan of Care  Goal: Plan of Care Review  3/14/2020 0925 by Lynne Pendleton RN  Outcome: Improving     Data: Vital signs within normal limits. Postpartum checks within normal limits - see flow record. Patient eating and drinking normally. Patient able to empty bladder independently. . Patient ambulating independently..   No apparent signs of infection. Lac 2nd degree healing well. Patient Is performing self cares and Is able to care for infant. Positive attachment behaviors are observed with infant. Support persons are present.  Action:  Pain plan was discussed. Patient would like pain meds to be brought in when they are due. Patient was medicated during the shift for  headache, reports constant even when laying flat. Gets some relief from ibuprofen . See MAR.Patient education done about  cares, postpartum cares, pain management/plan,  safety, and discharge from hospital. See flow record.  Response:   Patient reassessed within 1 hour after each medication for pain. Patient stated that pain had improved. Patient stated that she was comfortable. .   Plan: Anticipate discharge on 3/14/20.

## 2020-03-16 ENCOUNTER — HOSPITAL ENCOUNTER (EMERGENCY)
Facility: CLINIC | Age: 20
Discharge: HOME | End: 2020-03-16
Attending: PHYSICIAN ASSISTANT | Admitting: PHYSICIAN ASSISTANT
Payer: COMMERCIAL

## 2020-03-16 VITALS
TEMPERATURE: 98 F | SYSTOLIC BLOOD PRESSURE: 114 MMHG | DIASTOLIC BLOOD PRESSURE: 66 MMHG | BODY MASS INDEX: 21.59 KG/M2 | WEIGHT: 142 LBS | OXYGEN SATURATION: 99 % | HEART RATE: 65 BPM | RESPIRATION RATE: 18 BRPM

## 2020-03-16 DIAGNOSIS — G97.1 SPINAL HEADACHE: ICD-10-CM

## 2020-03-16 DIAGNOSIS — R51.9 ACUTE NONINTRACTABLE HEADACHE, UNSPECIFIED HEADACHE TYPE: ICD-10-CM

## 2020-03-16 LAB
ALBUMIN SERPL-MCNC: 3.2 G/DL (ref 3.4–5)
ALBUMIN UR-MCNC: NEGATIVE MG/DL
ALP SERPL-CCNC: 164 U/L (ref 40–150)
ALT SERPL W P-5'-P-CCNC: 30 U/L (ref 0–50)
ANION GAP SERPL CALCULATED.3IONS-SCNC: 6 MMOL/L (ref 3–14)
APPEARANCE UR: CLEAR
AST SERPL W P-5'-P-CCNC: 29 U/L (ref 0–45)
BASOPHILS # BLD AUTO: 0 10E9/L (ref 0–0.2)
BASOPHILS NFR BLD AUTO: 0.3 %
BILIRUB SERPL-MCNC: 0.3 MG/DL (ref 0.2–1.3)
BILIRUB UR QL STRIP: NEGATIVE
BUN SERPL-MCNC: 7 MG/DL (ref 7–30)
CALCIUM SERPL-MCNC: 8.7 MG/DL (ref 8.5–10.1)
CHLORIDE SERPL-SCNC: 106 MMOL/L (ref 94–109)
CO2 SERPL-SCNC: 25 MMOL/L (ref 20–32)
COLOR UR AUTO: YELLOW
CREAT SERPL-MCNC: 0.56 MG/DL (ref 0.52–1.04)
DIFFERENTIAL METHOD BLD: ABNORMAL
EOSINOPHIL # BLD AUTO: 0.1 10E9/L (ref 0–0.7)
EOSINOPHIL NFR BLD AUTO: 1.1 %
ERYTHROCYTE [DISTWIDTH] IN BLOOD BY AUTOMATED COUNT: 12.9 % (ref 10–15)
GFR SERPL CREATININE-BSD FRML MDRD: >90 ML/MIN/{1.73_M2}
GLUCOSE SERPL-MCNC: 84 MG/DL (ref 70–99)
GLUCOSE UR STRIP-MCNC: NEGATIVE MG/DL
HCT VFR BLD AUTO: 37 % (ref 35–47)
HGB BLD-MCNC: 11.9 G/DL (ref 11.7–15.7)
HGB UR QL STRIP: ABNORMAL
IMM GRANULOCYTES # BLD: 0.1 10E9/L (ref 0–0.4)
IMM GRANULOCYTES NFR BLD: 0.5 %
KETONES UR STRIP-MCNC: NEGATIVE MG/DL
LEUKOCYTE ESTERASE UR QL STRIP: ABNORMAL
LYMPHOCYTES # BLD AUTO: 2.5 10E9/L (ref 0.8–5.3)
LYMPHOCYTES NFR BLD AUTO: 27 %
MCH RBC QN AUTO: 31.4 PG (ref 26.5–33)
MCHC RBC AUTO-ENTMCNC: 32.2 G/DL (ref 31.5–36.5)
MCV RBC AUTO: 98 FL (ref 78–100)
MONOCYTES # BLD AUTO: 0.5 10E9/L (ref 0–1.3)
MONOCYTES NFR BLD AUTO: 5.2 %
MUCOUS THREADS #/AREA URNS LPF: PRESENT /LPF
NEUTROPHILS # BLD AUTO: 6.2 10E9/L (ref 1.6–8.3)
NEUTROPHILS NFR BLD AUTO: 65.9 %
NITRATE UR QL: NEGATIVE
NRBC # BLD AUTO: 0 10*3/UL
NRBC BLD AUTO-RTO: 0 /100
PH UR STRIP: 6 PH (ref 5–7)
PLATELET # BLD AUTO: 262 10E9/L (ref 150–450)
POTASSIUM SERPL-SCNC: 3.2 MMOL/L (ref 3.4–5.3)
PROT SERPL-MCNC: 7.7 G/DL (ref 6.8–8.8)
RBC # BLD AUTO: 3.79 10E12/L (ref 3.8–5.2)
RBC #/AREA URNS AUTO: 5 /HPF (ref 0–2)
SODIUM SERPL-SCNC: 137 MMOL/L (ref 133–144)
SOURCE: ABNORMAL
SP GR UR STRIP: 1.01 (ref 1–1.03)
SQUAMOUS #/AREA URNS AUTO: 5 /HPF (ref 0–1)
UROBILINOGEN UR STRIP-MCNC: 0 MG/DL (ref 0–2)
WBC # BLD AUTO: 9.4 10E9/L (ref 4–11)
WBC #/AREA URNS AUTO: 33 /HPF (ref 0–5)

## 2020-03-16 PROCEDURE — 25000128 H RX IP 250 OP 636: Performed by: EMERGENCY MEDICINE

## 2020-03-16 PROCEDURE — 96361 HYDRATE IV INFUSION ADD-ON: CPT | Performed by: PHYSICIAN ASSISTANT

## 2020-03-16 PROCEDURE — 99284 EMERGENCY DEPT VISIT MOD MDM: CPT | Mod: Z6 | Performed by: PHYSICIAN ASSISTANT

## 2020-03-16 PROCEDURE — 25000132 ZZH RX MED GY IP 250 OP 250 PS 637: Performed by: EMERGENCY MEDICINE

## 2020-03-16 PROCEDURE — 80053 COMPREHEN METABOLIC PANEL: CPT | Performed by: EMERGENCY MEDICINE

## 2020-03-16 PROCEDURE — 25800030 ZZH RX IP 258 OP 636: Performed by: EMERGENCY MEDICINE

## 2020-03-16 PROCEDURE — 85025 COMPLETE CBC W/AUTO DIFF WBC: CPT | Performed by: EMERGENCY MEDICINE

## 2020-03-16 PROCEDURE — 81001 URINALYSIS AUTO W/SCOPE: CPT | Performed by: EMERGENCY MEDICINE

## 2020-03-16 PROCEDURE — 96374 THER/PROPH/DIAG INJ IV PUSH: CPT | Performed by: PHYSICIAN ASSISTANT

## 2020-03-16 PROCEDURE — 99284 EMERGENCY DEPT VISIT MOD MDM: CPT | Mod: 25 | Performed by: PHYSICIAN ASSISTANT

## 2020-03-16 RX ORDER — METOCLOPRAMIDE HYDROCHLORIDE 5 MG/ML
10 INJECTION INTRAMUSCULAR; INTRAVENOUS ONCE
Status: COMPLETED | OUTPATIENT
Start: 2020-03-16 | End: 2020-03-16

## 2020-03-16 RX ORDER — METOCLOPRAMIDE 10 MG/1
10 TABLET ORAL 3 TIMES DAILY PRN
Qty: 30 TABLET | Refills: 0 | Status: SHIPPED | OUTPATIENT
Start: 2020-03-16 | End: 2020-03-26

## 2020-03-16 RX ORDER — SODIUM CHLORIDE 9 MG/ML
1000 INJECTION, SOLUTION INTRAVENOUS CONTINUOUS
Status: DISCONTINUED | OUTPATIENT
Start: 2020-03-16 | End: 2020-03-16 | Stop reason: HOSPADM

## 2020-03-16 RX ORDER — ACETAMINOPHEN 500 MG
1000 TABLET ORAL ONCE
Status: COMPLETED | OUTPATIENT
Start: 2020-03-16 | End: 2020-03-16

## 2020-03-16 RX ADMIN — ACETAMINOPHEN 1000 MG: 500 TABLET, FILM COATED ORAL at 10:58

## 2020-03-16 RX ADMIN — METOCLOPRAMIDE HYDROCHLORIDE 10 MG: 5 INJECTION INTRAMUSCULAR; INTRAVENOUS at 10:59

## 2020-03-16 RX ADMIN — SODIUM CHLORIDE 1000 ML: 9 INJECTION, SOLUTION INTRAVENOUS at 10:58

## 2020-03-16 ASSESSMENT — ENCOUNTER SYMPTOMS
ABDOMINAL PAIN: 0
SHORTNESS OF BREATH: 0
HEADACHES: 1
FEVER: 0

## 2020-03-16 NOTE — ED PROVIDER NOTES
History     Chief Complaint   Patient presents with     Headache     3 days post partum         Pat Chaney is a 19 year old female who Is  with recent vaginal delivery of healthy baby girl on , 3 days ago.  Patient spent 24 hours in the hospital.  Patient had been given buccal miso  For ppx given expeditious delivery, however no other complications during pregnancy/delivery.  Patient did have intrathecal anesthesia performed for anesthesia.  She now presents because of worsening headache during the daytime today.  She reports global moderate headache, however this becomes severe when patient is upright.  The pain is much less than when she is laying flat.  There is been no vomiting.  No fever.  No back pain.  Vaginal bleeding has decreased.  Denies any urinary symptoms.  Patient is currently breast-feeding.    Allergies:  No Known Allergies    Problem List:    Patient Active Problem List    Diagnosis Date Noted      (spontaneous vaginal delivery) 2020     Priority: Medium     Echogenic intracardiac focus of fetus on prenatal ultrasound 2019     Priority: Medium     Encounter for supervision of other normal pregnancy, unspecified trimester 2019     Priority: Medium     [place], [employer], [place], daughter-Lina 2015  Joaquín, involved boyfriend  Blood Type: A Rh Positive   Genetic Screen: declines  LMP: 19,    Cycle length: 5-6 days, every 24 days  Hx of: vaginal delivery 2015,  Anemia,  GDM-,  STD's,  Former E cig smoker  Early Glucose: yes indicated  Gender:  Pertussis Vaccine:  Flu Vaccine:       Bipolar 2 disorder (H) 2019     Priority: Medium     Attention deficit hyperactivity disorder (ADHD) 2005     Priority: Medium        Past Medical History:    No past medical history on file.    Past Surgical History:    Past Surgical History:   Procedure Laterality Date     BIOPSY KIDNEY       MOUTH SURGERY      wisdom teeth       Family History:     Family History   Problem Relation Age of Onset     Asthma Maternal Grandmother      Arthritis Maternal Grandmother        Social History:  Marital Status:  Single [1]  Social History     Tobacco Use     Smoking status: Former Smoker     Types: Vaping Device     Smokeless tobacco: Never Used     Tobacco comment: outdoors   Substance Use Topics     Alcohol use: No     Drug use: No        Medications:    metoclopramide (REGLAN) 10 MG tablet  acetaminophen (TYLENOL) 325 MG tablet  ARIPiprazole (ABILIFY) 5 MG tablet  CONCERTA 36 MG OR TBCR  ferrous sulfate (FE TABS) 325 (65 Fe) MG EC tablet  ibuprofen (ADVIL/MOTRIN) 600 MG tablet  Prenatal Vit-Fe Fumarate-FA (PRENATAL MULTIVITAMIN W/IRON) 27-0.8 MG tablet  SENNA-docusate sodium (SENNA S) 8.6-50 MG tablet          Review of Systems   Constitutional: Negative for fever.   Respiratory: Negative for shortness of breath.    Cardiovascular: Negative for chest pain.   Gastrointestinal: Negative for abdominal pain.   Neurological: Positive for headaches.   All other systems reviewed and are negative.      Physical Exam   BP: 135/77  Pulse: 77  Heart Rate: 63  Temp: 98  F (36.7  C)  Resp: 16  Weight: 64.4 kg (142 lb)  SpO2: 99 %      Physical Exam  /66   Pulse 65   Temp 98  F (36.7  C) (Oral)   Resp 18   Wt 64.4 kg (142 lb)   SpO2 99%   BMI 21.59 kg/m    General: alert, interactive, in mild distress on arrival  Head: atraumatic  Nose: no rhinorrhea or epistaxis  Ears: no external auditory canal discharge or bleeding.    Eyes: Sclera nonicteric. Conjunctiva noninjected. PERRL, EOMI  Mouth: no tonsillar erythema, edema, or exudate  Neck: supple, no palp LAD  Lungs: CTAB  CV: RRR, S1/S2; peripheral pulses palpable and symmetric  Abdomen: soft, nt, nd, no guarding or rebound. Positive bowel sounds  Extremities: no cyanosis or edema  Skin: no rash or diaphoresis  Neuro:  strength 5/5 in UE and LEs bilaterally, sensation intact to light touch in UE and LEs bilaterally;        ED Course        Procedures               Critical Care time:  none               Results for orders placed or performed during the hospital encounter of 03/16/20 (from the past 24 hour(s))   CBC with platelets differential   Result Value Ref Range    WBC 9.4 4.0 - 11.0 10e9/L    RBC Count 3.79 (L) 3.8 - 5.2 10e12/L    Hemoglobin 11.9 11.7 - 15.7 g/dL    Hematocrit 37.0 35.0 - 47.0 %    MCV 98 78 - 100 fl    MCH 31.4 26.5 - 33.0 pg    MCHC 32.2 31.5 - 36.5 g/dL    RDW 12.9 10.0 - 15.0 %    Platelet Count 262 150 - 450 10e9/L    Diff Method Automated Method     % Neutrophils 65.9 %    % Lymphocytes 27.0 %    % Monocytes 5.2 %    % Eosinophils 1.1 %    % Basophils 0.3 %    % Immature Granulocytes 0.5 %    Nucleated RBCs 0 0 /100    Absolute Neutrophil 6.2 1.6 - 8.3 10e9/L    Absolute Lymphocytes 2.5 0.8 - 5.3 10e9/L    Absolute Monocytes 0.5 0.0 - 1.3 10e9/L    Absolute Eosinophils 0.1 0.0 - 0.7 10e9/L    Absolute Basophils 0.0 0.0 - 0.2 10e9/L    Abs Immature Granulocytes 0.1 0 - 0.4 10e9/L    Absolute Nucleated RBC 0.0    Comprehensive metabolic panel   Result Value Ref Range    Sodium 137 133 - 144 mmol/L    Potassium 3.2 (L) 3.4 - 5.3 mmol/L    Chloride 106 94 - 109 mmol/L    Carbon Dioxide 25 20 - 32 mmol/L    Anion Gap 6 3 - 14 mmol/L    Glucose 84 70 - 99 mg/dL    Urea Nitrogen 7 7 - 30 mg/dL    Creatinine 0.56 0.52 - 1.04 mg/dL    GFR Estimate >90 >60 mL/min/[1.73_m2]    GFR Estimate If Black >90 >60 mL/min/[1.73_m2]    Calcium 8.7 8.5 - 10.1 mg/dL    Bilirubin Total 0.3 0.2 - 1.3 mg/dL    Albumin 3.2 (L) 3.4 - 5.0 g/dL    Protein Total 7.7 6.8 - 8.8 g/dL    Alkaline Phosphatase 164 (H) 40 - 150 U/L    ALT 30 0 - 50 U/L    AST 29 0 - 45 U/L   UA reflex to Microscopic   Result Value Ref Range    Color Urine Yellow     Appearance Urine Clear     Glucose Urine Negative NEG^Negative mg/dL    Bilirubin Urine Negative NEG^Negative    Ketones Urine Negative NEG^Negative mg/dL    Specific Gravity Urine 1.013  1.003 - 1.035    Blood Urine Moderate (A) NEG^Negative    pH Urine 6.0 5.0 - 7.0 pH    Protein Albumin Urine Negative NEG^Negative mg/dL    Urobilinogen mg/dL 0.0 0.0 - 2.0 mg/dL    Nitrite Urine Negative NEG^Negative    Leukocyte Esterase Urine Moderate (A) NEG^Negative    Source Midstream Urine     RBC Urine 5 (H) 0 - 2 /HPF    WBC Urine 33 (H) 0 - 5 /HPF    Squamous Epithelial /HPF Urine 5 (H) 0 - 1 /HPF    Mucous Urine Present (A) NEG^Negative /LPF       Medications   0.9% sodium chloride BOLUS (0 mLs Intravenous Stopped 3/16/20 1218)   metoclopramide (REGLAN) injection 10 mg (10 mg Intravenous Given 3/16/20 1059)   acetaminophen (TYLENOL) tablet 1,000 mg (1,000 mg Oral Given 3/16/20 1058)       Assessments & Plan (with Medical Decision Making)  19 year old female with past medical history as reviewed above, with recent delivery of healthy baby girl 3 days ago, presenting to the emergency department with concerns regarding headache.  Headache is tolerable when patient is laying flat, however becomes much more severe when patient is standing upright.  She is well-appearing, nontoxic on exam.  She has normal neurologic exam.  Differential includes post spinal headache, tension headache.  Patient without significant history of prior migraine headaches.  Consideration of central venous sinus thrombosis, or postpartum preeclampsia, however laboratory work-up is unremarkable, with no protein in the urine.  Alkaline phosphatase is slightly elevated, however remainder of LFTs are normal.  Platelet count is normal.  Patient given IV fluids, in addition to Tylenol, and Reglan, and did have much improvement, with symptomatic relief.  At this point, feel that symptoms are most consistent with below spinal headache.  Patient will be discharged home as she is now feeling much improved.  Will not trial blood patch procedure at this point.     I have reviewed the nursing notes.    I have reviewed the findings, diagnosis, plan  and need for follow up with the patient.       Discharge Medication List as of 3/16/2020 12:19 PM      START taking these medications    Details   metoclopramide (REGLAN) 10 MG tablet Take 1 tablet (10 mg) by mouth 3 times daily as needed (nausea or headache), Disp-30 tablet,R-0, Local Print             Final diagnoses:   Acute nonintractable headache, unspecified headache type   Spinal headache       3/16/2020   [facility] EMERGENCY DEPARTMENT     Aniket Portillo MD  03/16/20 [id number]

## 2020-03-16 NOTE — ED AVS SNAPSHOT
[facility]  [address] [address]  Phone:  [phone]  Fax:  [phone]                                    Pat Chaney   MRN: [id number]    Department:  [facility]   Date of Visit:  3/16/2020           After Visit Summary Signature Page    I have received my discharge instructions, and my questions have been answered. I have discussed any challenges I see with this plan with the nurse or doctor.    ..........................................................................................................................................  Patient/Patient Representative Signature      ..........................................................................................................................................  Patient Representative Print Name and Relationship to Patient    ..................................................               ................................................  Date                                   Time    ..........................................................................................................................................  Reviewed by Signature/Title    ...................................................              ..............................................  Date                                               Time          22EPIC Rev 08/18

## 2020-03-17 ENCOUNTER — HOSPITAL ENCOUNTER (OUTPATIENT)
Facility: CLINIC | Age: 20
Discharge: HOME | End: 2020-03-17
Attending: OBSTETRICS & GYNECOLOGY | Admitting: OBSTETRICS & GYNECOLOGY
Payer: COMMERCIAL

## 2020-03-17 ENCOUNTER — ANESTHESIA EVENT (OUTPATIENT)
Dept: SURGERY | Facility: CLINIC | Age: 20
End: 2020-03-17
Payer: COMMERCIAL

## 2020-03-17 ENCOUNTER — ANESTHESIA (OUTPATIENT)
Dept: SURGERY | Facility: CLINIC | Age: 20
End: 2020-03-17
Payer: COMMERCIAL

## 2020-03-17 LAB — COPATH REPORT: NORMAL

## 2020-03-17 PROCEDURE — 37000008 ZZH ANESTHESIA TECHNICAL FEE, 1ST 30 MIN

## 2020-03-17 PROCEDURE — 62273 INJECT EPIDURAL PATCH: CPT

## 2020-03-17 PROCEDURE — 25000125 ZZHC RX 250: Performed by: NURSE ANESTHETIST, CERTIFIED REGISTERED

## 2020-03-17 RX ORDER — LIDOCAINE HYDROCHLORIDE 10 MG/ML
INJECTION, SOLUTION EPIDURAL; INFILTRATION; INTRACAUDAL; PERINEURAL PRN
Status: DISCONTINUED | OUTPATIENT
Start: 2020-03-17 | End: 2020-03-17

## 2020-03-17 RX ADMIN — LIDOCAINE HYDROCHLORIDE 9 ML: 10 INJECTION, SOLUTION EPIDURAL; INFILTRATION; INTRACAUDAL; PERINEURAL at 15:10

## 2020-03-17 NOTE — ANESTHESIA POSTPROCEDURE EVALUATION
Patient: Pat Chaney    Procedure(s):  Blood Patch    Diagnosis:Headache [R51]  Diagnosis Additional Information: No value filed.    Anesthesia Type:  Epidural    Note:  Anesthesia Post Evaluation    Patient location during evaluation: Bedside  Patient participation: Able to fully participate in evaluation  Level of consciousness: awake and alert  Pain management: adequate (Patient denies any HA.)  Airway patency: patent  Cardiovascular status: acceptable  Respiratory status: acceptable  Hydration status: acceptable  PONV: none     Anesthetic complications: None          Last vitals:  There were no vitals filed for this visit.      Electronically Signed By: KIRBY Lopez CRNA  March 17, 2020  3:43 PM

## 2020-03-17 NOTE — ANESTHESIA PREPROCEDURE EVALUATION
Anesthesia Pre-Procedure Evaluation    Patient: Pat Chaney   MRN: [id number] : 2000          Preoperative Diagnosis: Headache [R51]    Procedure(s):  Blood Patch    No past medical history on file.  Past Surgical History:   Procedure Laterality Date     BIOPSY KIDNEY       MOUTH SURGERY      wisdom teeth       Anesthesia Evaluation     .             ROS/MED HX    ENT/Pulmonary:  - neg pulmonary ROS     Neurologic: Comment: Patient had intrathecal narcotic injection 3/13/20 for labor pain.  Developed HA and was seen in ER 3/16/20 for IV fluids and pain medication.  Continued with significant orthostatic HA today.    (+)other neuro Postdural Puncture Headache    Cardiovascular:  - neg cardiovascular ROS       METS/Exercise Tolerance:     Hematologic:  - neg hematologic  ROS       Musculoskeletal:  - neg musculoskeletal ROS       GI/Hepatic:  - neg GI/hepatic ROS       Renal/Genitourinary:  - ROS Renal section negative       Endo:  - neg endo ROS       Psychiatric:  - neg psychiatric ROS       Infectious Disease:  - neg infectious disease ROS       Malignancy:      - no malignancy   Other:    - neg other ROS                      Physical Exam  Normal systems: cardiovascular, pulmonary and dental    Airway   Mallampati: I  TM distance: >3 FB  Neck ROM: full    Dental     Cardiovascular       Pulmonary             Lab Results   Component Value Date    WBC 9.4 2020    HGB 11.9 2020    HCT 37.0 2020     2020     2020    POTASSIUM 3.2 (L) 2020    CHLORIDE 106 2020    CO2 25 2020    BUN 7 2020    CR 0.56 2020    GLC 84 2020    MARIA D 8.7 2020    PHOS Charge credited 2007    ALBUMIN 3.2 (L) 2020    PROTTOTAL 7.7 2020    ALT 30 2020    AST 29 2020    ALKPHOS 164 (H) 2020    BILITOTAL 0.3 2020    PTT 32 2007    INR 1.16 (H) 2007    HCG Negative 2018    HCGS Positive (A)  "07/24/2019       Preop Vitals  BP Readings from Last 3 Encounters:   03/16/20 114/66   03/14/20 125/73   07/24/19 134/81    Pulse Readings from Last 3 Encounters:   03/16/20 65   03/14/20 65   07/24/19 97      Resp Readings from Last 3 Encounters:   03/16/20 18   03/14/20 16   07/24/19 18    SpO2 Readings from Last 3 Encounters:   03/16/20 99%   03/14/20 99%   07/24/19 99%      Temp Readings from Last 1 Encounters:   03/16/20 36.7  C (98  F) (Oral)    Ht Readings from Last 1 Encounters:   03/14/20 1.727 m (5' 8\")      Wt Readings from Last 1 Encounters:   03/16/20 64.4 kg (142 lb)    Estimated body mass index is 21.59 kg/m  as calculated from the following:    Height as of 3/14/20: 1.727 m (5' 8\").    Weight as of 3/16/20: 64.4 kg (142 lb).       Anesthesia Plan      History & Physical Review      ASA Status:  1 .        Plan for Epidural     Risks, benefits alternatives of epidural blood patch discussed, patient agrees, all questions answered.        Postoperative Care      Consents                 KIRBY Lopez CRNA  "

## 2020-09-17 ENCOUNTER — MEDICAL CORRESPONDENCE (OUTPATIENT)
Dept: HEALTH INFORMATION MANAGEMENT | Facility: CLINIC | Age: 20
End: 2020-09-17

## 2020-12-06 ENCOUNTER — HEALTH MAINTENANCE LETTER (OUTPATIENT)
Age: 20
End: 2020-12-06

## 2021-01-07 ENCOUNTER — HOSPITAL ENCOUNTER (EMERGENCY)
Facility: CLINIC | Age: 21
Discharge: HOME | End: 2021-01-07
Attending: NURSE PRACTITIONER | Admitting: NURSE PRACTITIONER
Payer: COMMERCIAL

## 2021-01-07 ENCOUNTER — APPOINTMENT (OUTPATIENT)
Dept: GENERAL RADIOLOGY | Facility: CLINIC | Age: 21
End: 2021-01-07
Attending: NURSE PRACTITIONER
Payer: COMMERCIAL

## 2021-01-07 VITALS
HEART RATE: 71 BPM | SYSTOLIC BLOOD PRESSURE: 124 MMHG | WEIGHT: 125 LBS | RESPIRATION RATE: 16 BRPM | TEMPERATURE: 99 F | BODY MASS INDEX: 19.01 KG/M2 | DIASTOLIC BLOOD PRESSURE: 86 MMHG | OXYGEN SATURATION: 97 %

## 2021-01-07 DIAGNOSIS — M79.672 LEFT FOOT PAIN: ICD-10-CM

## 2021-01-07 PROCEDURE — G0463 HOSPITAL OUTPT CLINIC VISIT: HCPCS | Performed by: NURSE PRACTITIONER

## 2021-01-07 PROCEDURE — 99213 OFFICE O/P EST LOW 20 MIN: CPT | Performed by: NURSE PRACTITIONER

## 2021-01-07 PROCEDURE — 73630 X-RAY EXAM OF FOOT: CPT | Mod: LT

## 2021-01-07 RX ORDER — NORELGESTROMIN AND ETHINYL ESTRADIOL 35; 150 UG/MG; UG/MG
1 PATCH TRANSDERMAL
COMMUNITY
Start: 2020-08-26

## 2021-01-07 ASSESSMENT — ENCOUNTER SYMPTOMS
DIZZINESS: 0
LIGHT-HEADEDNESS: 0
BACK PAIN: 0
CARDIOVASCULAR NEGATIVE: 1
HEADACHES: 0
COLOR CHANGE: 1
NECK PAIN: 0
WOUND: 0
WEAKNESS: 0
MYALGIAS: 1
CONSTITUTIONAL NEGATIVE: 1
JOINT SWELLING: 0
NUMBNESS: 1
NECK STIFFNESS: 0
ARTHRALGIAS: 1
RESPIRATORY NEGATIVE: 1

## 2021-01-07 NOTE — ED NOTES
Patient presents today with left foot pain . Symptoms started today after dropping a glass bowl on foot. Pt is concerned about pain, and by how cold her foot is. Arrived to urgent care ambulatory .

## 2021-01-07 NOTE — ED AVS SNAPSHOT
[facility]  [address] [address]  Phone: [phone]  Fax: [phone]                                    Pat Chaney   MRN: [id number]    Department: [facility]   Date of Visit: 1/7/2021           After Visit Summary Signature Page    I have received my discharge instructions, and my questions have been answered. I have discussed any challenges I see with this plan with the nurse or doctor.    ..........................................................................................................................................  Patient/Patient Representative Signature      ..........................................................................................................................................  Patient Representative Print Name and Relationship to Patient    ..................................................               ................................................  Date                                   Time    ..........................................................................................................................................  Reviewed by Signature/Title    ...................................................              ..............................................  Date                                               Time          22EPIC Rev 08/18

## 2021-01-07 NOTE — ED PROVIDER NOTES
History     Chief Complaint   Patient presents with     Foot Injury     dropped heavy plate on foot  numbness in toes  pain to put pressure on foot      HPI  Pat Chaney is a 20 year old female who presents to the urgent care for evaluation of left foot pain. Yesterday patient dropped a heavy plate onto the top of the left foot. She has since had some numbness into the toes, pain with ambulation and a developing bruise to the area of injury. Reports foot feels like ice and she 'has bad circulation at baseline'. Has not been using OTC medications. Is wearing sandals to the visit today. No other accompanying injuries.     Allergies:  No Known Allergies    Problem List:    Patient Active Problem List    Diagnosis Date Noted      (spontaneous vaginal delivery) 2020     Priority: Medium     Echogenic intracardiac focus of fetus on prenatal ultrasound 2019     Priority: Medium     Encounter for supervision of other normal pregnancy, unspecified trimester 2019     Priority: Medium     [place], [employer], [place], daughter-Lina 2015  Joaquín, involved boyfriend  Blood Type: A Rh Positive   Genetic Screen: declines  LMP: 19,    Cycle length: 5-6 days, every 24 days  Hx of: vaginal delivery 2015,  Anemia,  GDM-,  STD's,  Former E cig smoker  Early Glucose: yes indicated  Gender:  Pertussis Vaccine:  Flu Vaccine:       Bipolar 2 disorder (H) 2019     Priority: Medium     Attention deficit hyperactivity disorder (ADHD) 2005     Priority: Medium        Past Medical History:    No past medical history on file.    Past Surgical History:    Past Surgical History:   Procedure Laterality Date     ANESTHESIA OUT OF OR MRI N/A 3/17/2020    Procedure: Blood Patch;  Surgeon: GENERIC ANESTHESIA PROVIDER;  Location: [place] OR     BIOPSY KIDNEY       MOUTH SURGERY      wisdom teeth       Family History:    Family History   Problem Relation Age of Onset     Asthma Maternal Grandmother       Arthritis Maternal Grandmother        Social History:  Marital Status:  Single [1]  Social History     Tobacco Use     Smoking status: Former Smoker     Types: Vaping Device     Smokeless tobacco: Never Used     Tobacco comment: outdoors   Substance Use Topics     Alcohol use: No     Drug use: No      Medications:         norelgestromin-ethinyl estradiol (ORTHO EVRA) 150-35 MCG/24HR patch       acetaminophen (TYLENOL) 325 MG tablet       ARIPiprazole (ABILIFY) 5 MG tablet       ferrous sulfate (FE TABS) 325 (65 Fe) MG EC tablet       ibuprofen (ADVIL/MOTRIN) 600 MG tablet      Review of Systems   Constitutional: Negative.    Respiratory: Negative.    Cardiovascular: Negative.    Musculoskeletal: Positive for arthralgias, gait problem and myalgias. Negative for back pain, joint swelling, neck pain and neck stiffness.   Skin: Positive for color change. Negative for pallor and wound.   Neurological: Positive for numbness. Negative for dizziness, weakness, light-headedness and headaches.       Physical Exam   BP: 124/86  Pulse: 71  Temp: 99  F (37.2  C)  Resp: 16  Weight: 56.7 kg (125 lb)  SpO2: 97 %      Physical Exam  Constitutional:       General: She is not in acute distress.     Appearance: Normal appearance.   Cardiovascular:      Rate and Rhythm: Normal rate and regular rhythm.      Pulses: Normal pulses.      Heart sounds: Normal heart sounds.   Musculoskeletal: Normal range of motion.        Feet:    Skin:     General: Skin is warm.      Capillary Refill: Capillary refill takes less than 2 seconds.   Neurological:      General: No focal deficit present.      Mental Status: She is alert.   Psychiatric:         Mood and Affect: Mood normal.       ED Course        Procedures    Results for orders placed or performed during the hospital encounter of 01/07/21 (from the past 24 hour(s))   Foot XR, G/E 3 views, left    Narrative    XR FOOT LT G/E 3 VW   1/7/2021 4:20 PM     HISTORY:  left foot pain after dropping  object on foot.      Impression    IMPRESSION: Unremarkable exam.    SKIP SALCEDO MD     Medications - No data to display    Assessments & Plan (with Medical Decision Making)   Pat Chaney is a 20 year old female who presents to the urgent care for evaluation of left foot pain. Yesterday patient dropped a heavy plate onto the top of the left foot. She has since had some numbness into the toes, pain with ambulation and a developing bruise to the area of injury. Reports foot feels like ice and she 'has bad circulation at baseline'. Has not been using OTC medications. Is wearing sandals to the visit today.  Vital signs normal.  Exam as above consistent with soft tissue injury.  No suspicion for blood clot or fracture.  X-ray is unremarkable.  Discussed home symptom management including rest, heat/ice, ibuprofen and wear appropriate footwear for the injury/winter.  Return with new or worsening symptoms.  Follow-up with primary care if symptoms persist.  Patient is agreeable to plan of care, all questions answered.  Discharged in no acute distress.  I have reviewed the nursing notes.    I have reviewed the findings, diagnosis, plan and need for follow up with the patient.  Discharge Medication List as of 1/7/2021  4:44 PM        Final diagnoses:   Left foot pain     01/7/2021   [facility] EMERGENCY DEPT     Gloria Calvo, APRN CNP  01/07/21 1739

## 2021-07-21 ENCOUNTER — APPOINTMENT (OUTPATIENT)
Dept: CT IMAGING | Facility: CLINIC | Age: 21
End: 2021-07-21
Attending: EMERGENCY MEDICINE
Payer: COMMERCIAL

## 2021-07-21 ENCOUNTER — HOSPITAL ENCOUNTER (EMERGENCY)
Facility: CLINIC | Age: 21
Discharge: HOME | End: 2021-07-21
Attending: EMERGENCY MEDICINE | Admitting: EMERGENCY MEDICINE
Payer: COMMERCIAL

## 2021-07-21 VITALS
BODY MASS INDEX: 18.83 KG/M2 | HEIGHT: 67 IN | RESPIRATION RATE: 18 BRPM | DIASTOLIC BLOOD PRESSURE: 64 MMHG | HEART RATE: 57 BPM | SYSTOLIC BLOOD PRESSURE: 106 MMHG | WEIGHT: 120 LBS | TEMPERATURE: 97.1 F | OXYGEN SATURATION: 99 %

## 2021-07-21 DIAGNOSIS — R10.9 RIGHT FLANK PAIN: ICD-10-CM

## 2021-07-21 DIAGNOSIS — N39.0 ACUTE UTI: ICD-10-CM

## 2021-07-21 LAB
ALBUMIN UR-MCNC: 100 MG/DL
ANION GAP SERPL CALCULATED.3IONS-SCNC: 4 MMOL/L (ref 3–14)
APPEARANCE UR: ABNORMAL
BACTERIA #/AREA URNS HPF: ABNORMAL /HPF
BASOPHILS # BLD AUTO: 0.1 10E3/UL (ref 0–0.2)
BASOPHILS NFR BLD AUTO: 1 %
BILIRUB UR QL STRIP: NEGATIVE
BUN SERPL-MCNC: 7 MG/DL (ref 7–30)
CALCIUM SERPL-MCNC: 8.7 MG/DL (ref 8.5–10.1)
CHLORIDE BLD-SCNC: 109 MMOL/L (ref 94–109)
CO2 SERPL-SCNC: 25 MMOL/L (ref 20–32)
COLOR UR AUTO: YELLOW
CREAT SERPL-MCNC: 0.63 MG/DL (ref 0.52–1.04)
EOSINOPHIL # BLD AUTO: 0.1 10E3/UL (ref 0–0.7)
EOSINOPHIL NFR BLD AUTO: 1 %
ERYTHROCYTE [DISTWIDTH] IN BLOOD BY AUTOMATED COUNT: 12.5 % (ref 10–15)
GFR SERPL CREATININE-BSD FRML MDRD: >90 ML/MIN/1.73M2
GLUCOSE BLD-MCNC: 91 MG/DL (ref 70–99)
GLUCOSE UR STRIP-MCNC: NEGATIVE MG/DL
HCG UR QL: NEGATIVE
HCT VFR BLD AUTO: 38.2 % (ref 35–47)
HGB BLD-MCNC: 12.4 G/DL (ref 11.7–15.7)
HGB UR QL STRIP: ABNORMAL
IMM GRANULOCYTES # BLD: 0 10E3/UL
IMM GRANULOCYTES NFR BLD: 0 %
KETONES UR STRIP-MCNC: NEGATIVE MG/DL
LEUKOCYTE ESTERASE UR QL STRIP: ABNORMAL
LYMPHOCYTES # BLD AUTO: 2.8 10E3/UL (ref 0.8–5.3)
LYMPHOCYTES NFR BLD AUTO: 26 %
MCH RBC QN AUTO: 30.5 PG (ref 26.5–33)
MCHC RBC AUTO-ENTMCNC: 32.5 G/DL (ref 31.5–36.5)
MCV RBC AUTO: 94 FL (ref 78–100)
MONOCYTES # BLD AUTO: 0.7 10E3/UL (ref 0–1.3)
MONOCYTES NFR BLD AUTO: 7 %
MUCOUS THREADS #/AREA URNS LPF: PRESENT /LPF
NEUTROPHILS # BLD AUTO: 7 10E3/UL (ref 1.6–8.3)
NEUTROPHILS NFR BLD AUTO: 65 %
NITRATE UR QL: NEGATIVE
NRBC # BLD AUTO: 0 10E3/UL
NRBC BLD AUTO-RTO: 0 /100
PH UR STRIP: 7 [PH] (ref 5–7)
PLATELET # BLD AUTO: 262 10E3/UL (ref 150–450)
POTASSIUM BLD-SCNC: 3.9 MMOL/L (ref 3.4–5.3)
RBC # BLD AUTO: 4.07 10E6/UL (ref 3.8–5.2)
RBC URINE: 68 /HPF
SODIUM SERPL-SCNC: 138 MMOL/L (ref 133–144)
SP GR UR STRIP: 1.02 (ref 1–1.03)
SQUAMOUS EPITHELIAL: 7 /HPF
UROBILINOGEN UR STRIP-MCNC: NORMAL MG/DL
WBC # BLD AUTO: 10.7 10E3/UL (ref 4–11)
WBC CLUMPS #/AREA URNS HPF: PRESENT /HPF
WBC URINE: 160 /HPF

## 2021-07-21 PROCEDURE — 81001 URINALYSIS AUTO W/SCOPE: CPT | Performed by: EMERGENCY MEDICINE

## 2021-07-21 PROCEDURE — 81025 URINE PREGNANCY TEST: CPT | Performed by: EMERGENCY MEDICINE

## 2021-07-21 PROCEDURE — 36415 COLL VENOUS BLD VENIPUNCTURE: CPT | Performed by: EMERGENCY MEDICINE

## 2021-07-21 PROCEDURE — 80048 BASIC METABOLIC PNL TOTAL CA: CPT | Performed by: EMERGENCY MEDICINE

## 2021-07-21 PROCEDURE — 99284 EMERGENCY DEPT VISIT MOD MDM: CPT | Mod: 25 | Performed by: EMERGENCY MEDICINE

## 2021-07-21 PROCEDURE — 74176 CT ABD & PELVIS W/O CONTRAST: CPT

## 2021-07-21 PROCEDURE — 85025 COMPLETE CBC W/AUTO DIFF WBC: CPT | Performed by: EMERGENCY MEDICINE

## 2021-07-21 PROCEDURE — 36592 COLLECT BLOOD FROM PICC: CPT | Performed by: EMERGENCY MEDICINE

## 2021-07-21 PROCEDURE — 87086 URINE CULTURE/COLONY COUNT: CPT | Performed by: EMERGENCY MEDICINE

## 2021-07-21 PROCEDURE — 99284 EMERGENCY DEPT VISIT MOD MDM: CPT | Performed by: EMERGENCY MEDICINE

## 2021-07-21 RX ORDER — CIPROFLOXACIN 500 MG/1
500 TABLET, FILM COATED ORAL 2 TIMES DAILY
Qty: 10 TABLET | Refills: 0 | Status: SHIPPED | OUTPATIENT
Start: 2021-07-21 | End: 2021-07-21

## 2021-07-21 RX ORDER — CIPROFLOXACIN 500 MG/1
500 TABLET, FILM COATED ORAL 2 TIMES DAILY
Qty: 14 TABLET | Refills: 0 | Status: SHIPPED | OUTPATIENT
Start: 2021-07-21 | End: 2021-07-28

## 2021-07-21 ASSESSMENT — MIFFLIN-ST. JEOR: SCORE: 1331.95

## 2021-07-22 LAB — BACTERIA UR CULT: ABNORMAL

## 2021-07-22 NOTE — DISCHARGE INSTRUCTIONS
If symptoms worsen or new symptoms develop.  Follow-up with primary care physician next available.  Drink plenty of fluids.  If increased fevers decreased urine output increased abdominal pain or other symptoms develop please return for further evaluation and care.  Take antibiotics as directed.  Drink plenty of fluids.

## 2021-07-22 NOTE — RESULT ENCOUNTER NOTE
[facility] Emergency Dept discharge antibiotic (if prescribed): Ciprofloxacin (Cipro) 500 mg tablet, 1 tablet (500 mg) by mouth 2 times daily for 7 days.  Date of Rx (if applicable):  7/21/21  No changes in treatment per [facility] ED Lab Result Urine culture protocol.

## 2021-07-23 NOTE — ED PROVIDER NOTES
History     Chief Complaint   Patient presents with     Abdominal Pain     woke this morning with right sided abd pain radaiting to flank     HPI  Pat Chaney is a 21 year old female with no significant past medical history who presents emergency department complaining of flank pain radiating into her abdomen.  Patient woke this morning and had right-sided abdominal pain along with flank pain.  Pain is an aching pain that is worsened throughout the day.  She has had some urinary frequency but denies any urinary pain.  She has not had a fever chills.  Denies any headache.  She is not any chest pain or shortness of breath.  She denies nausea vomiting or diarrhea.  She denies any vaginal bleeding.  She denies chance of pregnancy.  She has not had any focal numbness weakness in extremity.  She currently rates her pain a 3 out of 10.    Allergies:  No Known Allergies    Problem List:    Patient Active Problem List    Diagnosis Date Noted      (spontaneous vaginal delivery) 2020     Priority: Medium     Echogenic intracardiac focus of fetus on prenatal ultrasound 2019     Priority: Medium     Encounter for supervision of other normal pregnancy, unspecified trimester 2019     Priority: Medium     Shannon, [employer], [place], daughter-Lina 2015  Joaquín, involved boyfriend  Blood Type: A Rh Positive   Genetic Screen: declines  LMP: 19,    Cycle length: 5-6 days, every 24 days  Hx of: vaginal delivery 2015,  Anemia,  GDM-,  STD's,  Former E cig smoker  Early Glucose: yes indicated  Gender:  Pertussis Vaccine:  Flu Vaccine:       Bipolar 2 disorder (H) 2019     Priority: Medium     Attention deficit hyperactivity disorder (ADHD) 2005     Priority: Medium        Past Medical History:    No past medical history on file.    Past Surgical History:    Past Surgical History:   Procedure Laterality Date     ANESTHESIA OUT OF OR MRI N/A 3/17/2020    Procedure: Blood Patch;  " Surgeon: GENERIC ANESTHESIA PROVIDER;  Location: [place] OR     BIOPSY KIDNEY       MOUTH SURGERY      wisdom teeth       Family History:    Family History   Problem Relation Age of Onset     Asthma Maternal Grandmother      Arthritis Maternal Grandmother        Social History:  Marital Status:  Single [1]  Social History     Tobacco Use     Smoking status: Former Smoker     Types: Vaping Device     Smokeless tobacco: Never Used     Tobacco comment: outdoors   Substance Use Topics     Alcohol use: No     Drug use: No        Medications:    ciprofloxacin (CIPRO) 500 MG tablet  acetaminophen (TYLENOL) 325 MG tablet  ARIPiprazole (ABILIFY) 5 MG tablet  ferrous sulfate (FE TABS) 325 (65 Fe) MG EC tablet  ibuprofen (ADVIL/MOTRIN) 600 MG tablet  norelgestromin-ethinyl estradiol (ORTHO EVRA) 150-35 MCG/24HR patch          Review of Systems  All systems reviewed and other than pertinent positives and negatives in HPI all other systems are negative.  Physical Exam   BP: 137/80  Pulse: 81  Temp: 97.1  F (36.2  C)  Resp: 18  Height: 170.2 cm (5' 7\")  Weight: 54.4 kg (120 lb)  SpO2: 98 %      Physical Exam  Vitals and nursing note reviewed.   Constitutional:       General: She is not in acute distress.     Appearance: She is well-developed. She is not ill-appearing, toxic-appearing or diaphoretic.   HENT:      Head: Normocephalic.      Nose: Nose normal. No congestion.      Mouth/Throat:      Mouth: Mucous membranes are moist.      Pharynx: Oropharynx is clear.   Eyes:      Conjunctiva/sclera: Conjunctivae normal.   Cardiovascular:      Rate and Rhythm: Normal rate and regular rhythm.      Pulses: Normal pulses.      Heart sounds: Normal heart sounds. No murmur heard.     Pulmonary:      Effort: Pulmonary effort is normal.      Breath sounds: Normal breath sounds. No wheezing, rhonchi or rales.   Chest:      Chest wall: No tenderness.   Abdominal:      Comments: Soft, nondistended tender to palpation right flank region and right " lower quadrant.  No guarding no rebound bowel sounds are positive.   Musculoskeletal:         General: Normal range of motion.      Cervical back: Normal range of motion and neck supple. No tenderness.      Right lower leg: No edema.      Left lower leg: No edema.   Skin:     General: Skin is warm and dry.      Capillary Refill: Capillary refill takes less than 2 seconds.      Findings: No rash.   Neurological:      General: No focal deficit present.      Mental Status: She is alert and oriented to person, place, and time.      Motor: No weakness.      Coordination: Coordination normal.   Psychiatric:         Mood and Affect: Mood normal.         ED Course        Procedures              Critical Care time:  none               Labs Ordered and Resulted from Time of ED Arrival Up to the Time of Departure from the ED   ROUTINE UA WITH MICROSCOPIC REFLEX TO CULTURE - Abnormal; Notable for the following components:       Result Value    Appearance Urine Slightly Cloudy (*)     Blood Urine Moderate (*)     Protein Albumin Urine 100  (*)     Leukocyte Esterase Urine Moderate (*)     Bacteria Urine Few (*)     WBC Clumps Urine Present (*)     Mucus Urine Present (*)     RBC Urine 68 (*)     WBC Urine 160 (*)     Squamous Epithelials Urine 7 (*)     All other components within normal limits    Narrative:     Urine Culture ordered based on laboratory criteria   BASIC METABOLIC PANEL - Normal   HCG QUALITATIVE URINE - Normal   CBC WITH PLATELETS AND DIFFERENTIAL   PERIPHERAL IV CATHETER   CBC WITH PLATELETS & DIFFERENTIAL    Narrative:     The following orders were created for panel order CBC with Platelets & Differential.  Procedure                               Abnormality         Status                     ---------                               -----------         ------                     CBC with platelets and d...[[id number]]                      Final result                 Please view results for these tests on the  individual orders.       Medications - No data to display  Results for orders placed or performed during the hospital encounter of 07/21/21   Abd/pelvis CT - no contrast - Stone Protocol    Narrative    EXAM: CT ABDOMEN PELVIS W/O CONTRAST  LOCATION: [facility]   DATE/TIME: 7/21/2021 6:04 PM    INDICATION: Flank pain, kidney stone suspected right flank pain, UTI  COMPARISON: None.  TECHNIQUE: CT scan of the abdomen and pelvis was performed without IV contrast. Multiplanar reformats were obtained. Dose reduction techniques were used.  CONTRAST: None.    FINDINGS:   LOWER CHEST: Normal.    HEPATOBILIARY: Normal.    PANCREAS: Normal.    SPLEEN: Normal.    ADRENAL GLANDS: Normal.    KIDNEYS/BLADDER: No urinary tract calcification or obstruction. Urinary bladder is nearly completely collapsed. Mild bladder wall thickening and adjacent inflammatory change suggested.    BOWEL: Normal caliber appendix. No free air or free fluid. No obstruction.    LYMPH NODES: Shotty bilateral inguinal lymph nodes. No abnormally enlarged mesenteric or retroperitoneal nodes.    VASCULATURE: Unremarkable.    PELVIC ORGANS: Normal.    MUSCULOSKELETAL: Normal.      Impression    IMPRESSION:   1.  No obstructive uropathy. No urinary tract calcification.  2.  Mild cystitis suggested.         Assessments & Plan (with Medical Decision Making) records were reviewed.  Labs were obtained.  CBC was unremarkable.  Basic metabolic panel without abnormality.  Urine analysis with moderate blood moderate leukocyte Estrace WBC clumps present 68 RBCs 160 WBCs with 7 squamous cells present.  Pregnancy test was negative.  Due to patient's presentation a CT renal stone protocol was obtained.  CT revealed no obstructive uropathy with mild cystitis suggested.  Findings discussed with patient.     I have reviewed the nursing notes.    I have reviewed the findings, diagnosis, plan and need for follow up with the patient.       Discharge  Medication List as of 7/21/2021  8:49 PM          Final diagnoses:   Acute UTI   Right flank pain       7/21/2021   [facility] EMERGENCY DEPT     Aniket Pillai MD  07/23/21 4749

## 2021-07-23 NOTE — RESULT ENCOUNTER NOTE
Final Urine Culture Report on 7/22/21  [facility] Emergency Dept discharge antibiotic prescribed: Ciprofloxacin (Cipro) 500 mg tablet, 1 tablet (500 mg) by mouth 2 times daily for 7 days.  #1. Bacteria, >100,000 colonies/mL Staphylococcus Saprophyticus, is SUSCEPTIBLE to Antibiotic.    Recommendations in treatment per [facility] ED lab result Urine Culture protocol.

## 2021-09-25 ENCOUNTER — HEALTH MAINTENANCE LETTER (OUTPATIENT)
Age: 21
End: 2021-09-25

## 2022-01-15 ENCOUNTER — HEALTH MAINTENANCE LETTER (OUTPATIENT)
Age: 22
End: 2022-01-15

## 2022-09-19 ENCOUNTER — LAB REQUISITION (OUTPATIENT)
Dept: LAB | Facility: CLINIC | Age: 22
End: 2022-09-19

## 2022-09-19 DIAGNOSIS — Z36.9 ENCOUNTER FOR ANTENATAL SCREENING, UNSPECIFIED: ICD-10-CM

## 2022-09-19 DIAGNOSIS — Z11.3 ENCOUNTER FOR SCREENING FOR INFECTIONS WITH A PREDOMINANTLY SEXUAL MODE OF TRANSMISSION: ICD-10-CM

## 2022-09-19 DIAGNOSIS — O99.019 ANEMIA COMPLICATING PREGNANCY, UNSPECIFIED TRIMESTER: ICD-10-CM

## 2022-09-19 LAB
BASOPHILS # BLD AUTO: 0 10E3/UL (ref 0–0.2)
BASOPHILS NFR BLD AUTO: 0 %
EOSINOPHIL # BLD AUTO: 0.1 10E3/UL (ref 0–0.7)
EOSINOPHIL NFR BLD AUTO: 1 %
ERYTHROCYTE [DISTWIDTH] IN BLOOD BY AUTOMATED COUNT: 13.6 % (ref 10–15)
FERRITIN SERPL-MCNC: 18 NG/ML (ref 6–175)
HCT VFR BLD AUTO: 31 % (ref 35–47)
HGB BLD-MCNC: 9.3 G/DL (ref 11.7–15.7)
IMM GRANULOCYTES # BLD: 0.1 10E3/UL
IMM GRANULOCYTES NFR BLD: 1 %
LYMPHOCYTES # BLD AUTO: 2.4 10E3/UL (ref 0.8–5.3)
LYMPHOCYTES NFR BLD AUTO: 32 %
MCH RBC QN AUTO: 26.3 PG (ref 26.5–33)
MCHC RBC AUTO-ENTMCNC: 30 G/DL (ref 31.5–36.5)
MCV RBC AUTO: 88 FL (ref 78–100)
MONOCYTES # BLD AUTO: 0.6 10E3/UL (ref 0–1.3)
MONOCYTES NFR BLD AUTO: 8 %
NEUTROPHILS # BLD AUTO: 4.5 10E3/UL (ref 1.6–8.3)
NEUTROPHILS NFR BLD AUTO: 58 %
NRBC # BLD AUTO: 0 10E3/UL
NRBC BLD AUTO-RTO: 0 /100
PLATELET # BLD AUTO: 275 10E3/UL (ref 150–450)
RBC # BLD AUTO: 3.53 10E6/UL (ref 3.8–5.2)
WBC # BLD AUTO: 7.7 10E3/UL (ref 4–11)

## 2022-09-19 PROCEDURE — 87491 CHLMYD TRACH DNA AMP PROBE: CPT | Performed by: ADVANCED PRACTICE MIDWIFE

## 2022-09-19 PROCEDURE — 82728 ASSAY OF FERRITIN: CPT | Performed by: ADVANCED PRACTICE MIDWIFE

## 2022-09-19 PROCEDURE — 87653 STREP B DNA AMP PROBE: CPT | Performed by: ADVANCED PRACTICE MIDWIFE

## 2022-09-19 PROCEDURE — 85025 COMPLETE CBC W/AUTO DIFF WBC: CPT | Performed by: ADVANCED PRACTICE MIDWIFE

## 2022-09-20 LAB
C TRACH DNA SPEC QL PROBE+SIG AMP: NEGATIVE
N GONORRHOEA DNA SPEC QL NAA+PROBE: NEGATIVE

## 2022-09-21 LAB — GP B STREP DNA SPEC QL NAA+PROBE: NEGATIVE

## 2023-01-07 ENCOUNTER — HEALTH MAINTENANCE LETTER (OUTPATIENT)
Age: 23
End: 2023-01-07

## 2023-04-22 ENCOUNTER — HEALTH MAINTENANCE LETTER (OUTPATIENT)
Age: 23
End: 2023-04-22

## 2023-10-25 ENCOUNTER — ANCILLARY PROCEDURE (OUTPATIENT)
Dept: ULTRASOUND IMAGING | Facility: CLINIC | Age: 23
End: 2023-10-25
Attending: ADVANCED PRACTICE MIDWIFE
Payer: COMMERCIAL

## 2023-10-25 DIAGNOSIS — Z3A.00 WEEKS OF GESTATION OF PREGNANCY NOT SPECIFIED: ICD-10-CM

## 2023-10-25 PROCEDURE — 76801 OB US < 14 WKS SINGLE FETUS: CPT | Mod: TC | Performed by: RADIOLOGY

## 2024-06-29 ENCOUNTER — HEALTH MAINTENANCE LETTER (OUTPATIENT)
Age: 24
End: 2024-06-29

## 2025-07-13 ENCOUNTER — HEALTH MAINTENANCE LETTER (OUTPATIENT)
Age: 25
End: 2025-07-13

## (undated) RX ORDER — FENTANYL CITRATE 50 UG/ML
INJECTION, SOLUTION INTRAMUSCULAR; INTRAVENOUS
Status: DISPENSED
Start: 2020-03-13

## (undated) RX ORDER — EPINEPHRINE 1 MG/ML(1)
AMPUL (ML) INJECTION
Status: DISPENSED
Start: 2020-03-13